# Patient Record
Sex: FEMALE | Race: WHITE | NOT HISPANIC OR LATINO | Employment: FULL TIME | URBAN - NONMETROPOLITAN AREA
[De-identification: names, ages, dates, MRNs, and addresses within clinical notes are randomized per-mention and may not be internally consistent; named-entity substitution may affect disease eponyms.]

---

## 2018-09-21 ENCOUNTER — OFFICE VISIT CONVERTED (OUTPATIENT)
Dept: FAMILY MEDICINE CLINIC | Age: 23
End: 2018-09-21
Attending: NURSE PRACTITIONER

## 2021-04-16 ENCOUNTER — BULK ORDERING (OUTPATIENT)
Dept: CASE MANAGEMENT | Facility: OTHER | Age: 26
End: 2021-04-16

## 2021-04-16 DIAGNOSIS — Z23 IMMUNIZATION DUE: ICD-10-CM

## 2021-05-18 NOTE — PROGRESS NOTES
Rolanda Neumann 1995     Office/Outpatient Visit    Visit Date: Fri, Sep 21, 2018 04:27 pm    Provider: Germaine Kramer N.P. (Assistant: Rubia Owusu)    Location: [facility]        Electronically signed by Germaine Kramer N.P. on  09/25/2018 08:54:53 AM                             SUBJECTIVE:        CC:     Ms. Neumann is a 22 year old White female.  excessive weight gain, fatigue, numbness and tingling in both arms, no period x 2 months (has taken 4 home pregnancy tests - all negative), wants thyroid checked;         HPI:         Patient complains of fatigue.  Patient describes problem of moderate fatigue moderate tiredness for years.  This has been a constant problem.  Patient states that she is not depressed.  She denies associated symptoms, including abdominal pain and adenopathy.  Current affective symptoms include anxious mood.  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  Ms. Neumann notes recent stressors, including third-shift work.      ROS:     CONSTITUTIONAL:  Positive for unintentional weight gain ( gradual; 50 pounds ).      GENITOURINARY:  Positive for irregular menstrual cycle (no periods in 2 months   - negative results from pregnancy).      MUSCULOSKELETAL:  Positive for limb pain ( bilateral upper extremity pain ).      NEUROLOGICAL:  Positive for paresthesia ( bilateral upper extremity ).      ENDOCRINE:  Positive for body feels heavy.      PSYCHIATRIC:  Positive for depression, feelings of stress and difficulty concentrating.          PMH/FMH/SH:     Last Reviewed on 9/25/2018 08:54 AM by Germaine Kramer    Past Medical History:                 PAST MEDICAL HISTORY             GYNECOLOGICAL HISTORY:    Problems with menstrual cycles include irregular frequency/duration.      Current method of contraception is birth control pills;     Last Pap was 10/2015    Has never had a mammogram. Sexually Active? yes         PREVENTIVE HEALTH MAINTENANCE             PAP  SMEAR: was last done 10/2015 with normal results         Surgical History:         Procedures:    Colonoscopy ( 12-2015normal/ dr ximena dunlap )         Family History:         Positive for Hypertension ( mat. GF ).      Positive for Breast Cancer ( MGGM ).      Positive for Type 2 Diabetes ( mat. GF ) and Hypothyroidism ( mother ).  Father: Type 2 Diabetes     Mother: Healthy     Sister(s): Healthy; 1 sister(s) total     Maternal Grandfather: Hypertension         Social History:     Occupation: [employer]     Marital Status: Single     Children: None         Tobacco/Alcohol/Supplements:     Last Reviewed on 9/21/2018 04:31 PM by Rubia Owusu    Tobacco: Past history of cigarette smoking, but has quit.          Substance Abuse History:     Last Reviewed on 6/22/2016 12:43 PM by Raegan Velázquez        Mental Health History:     Last Reviewed on 6/22/2016 12:43 PM by Raegan Velázquez        Communicable Diseases (eg STDs):     Last Reviewed on 6/22/2016 12:43 PM by Raegan Velázquez            Current Problems:     Last Reviewed on 9/25/2018 08:54 AM by Germaine Kramer    Fatigue     Anxiety with depression     IBS, diarrhea prominent type     Follow up, other contraceptive method         Immunizations:     zzGardasil 7/13/2016     Gardasil (HPV quadrivalent) 9/21/2016         Allergies:     Last Reviewed on 9/21/2018 04:31 PM by Rubia Owusu      No Known Drug Allergies.         Current Medications:     Last Reviewed on 9/21/2018 04:31 PM by Rubia Owusu    None        OBJECTIVE:        Vitals:         Current: 9/21/2018 4:31:04 PM    Ht:  5 ft, 2.5 in;  Wt: 178.4 lbs;  BMI: 32.1    T: 98.4 F (oral);  BP: 117/85 mm Hg (left arm, sitting);  P: 79 bpm (left arm (BP Cuff), sitting);  sCr: 0.7 mg/dL;  GFR: 129.04        Exams:     PHYSICAL EXAM:     GENERAL: vital signs recorded - well developed, well nourished, obese;  no apparent distress;     NECK: range of motion is normal;     RESPIRATORY: normal appearance and symmetric  expansion of chest wall; normal respiratory rate and pattern with no distress; normal breath sounds with no rales, rhonchi, wheezes or rubs;     CARDIOVASCULAR: normal rate; rhythm is regular;  no edema;     LYMPHATIC: no enlargement of cervical or facial nodes; no supraclavicular nodes;     MUSCULOSKELETAL: normal gait; normal range of motion of all major muscle groups; no limb or joint pain with range of motion;     NEUROLOGIC: mental status: alert and oriented x 3;     PSYCHIATRIC: affect/demeanor: anxious, tearful;  normal speech pattern; normal thought and perception;         ASSESSMENT           780.79   R53.83  Fatigue              DDx:         ORDERS:         Lab Orders:       [address] CBC, CMP, TSH, B12 levels FATIGUE PANEL  (Send-Out)                   PLAN:          Fatigue will get labs, I suspect some of this may be related to her shift work;  may be some anxiety / depression - discussed sleep study possibility - will have her follow up in 2 weeks after labs     LABORATORY:  Labs ordered to be performed today include Female Fatigue Panel (CBC, CMP, TSH, B12).            Orders:       [id number]  [facility] CBC, CMP, TSH, B12 levels FATIGUE PANEL  (Send-Out)               CHARGE CAPTURE           **Please note: ICD descriptions below are intended for billing purposes only and may not represent clinical diagnoses**        Primary Diagnosis:         780.79 Fatigue            R53.83    Other fatigue              Orders:          82170   Office/outpatient visit; established patient, level 3  (In-House)

## 2021-07-01 VITALS
HEART RATE: 79 BPM | TEMPERATURE: 98.4 F | HEIGHT: 63 IN | DIASTOLIC BLOOD PRESSURE: 85 MMHG | BODY MASS INDEX: 31.61 KG/M2 | SYSTOLIC BLOOD PRESSURE: 117 MMHG | WEIGHT: 178.4 LBS

## 2023-02-23 LAB
EXTERNAL ABO GROUPING: NORMAL
EXTERNAL ANTIBODY SCREEN: NEGATIVE
EXTERNAL HEPATITIS B SURFACE ANTIGEN: NEGATIVE
EXTERNAL HEPATITIS C AB: NONREACTIVE
EXTERNAL RH FACTOR: POSITIVE
EXTERNAL RUBELLA QUALITATIVE: NORMAL
EXTERNAL SYPHILIS RPR SCREEN: NORMAL
HIV1 P24 AG SERPL QL IA: NORMAL

## 2023-04-22 ENCOUNTER — HOSPITAL ENCOUNTER (EMERGENCY)
Facility: HOSPITAL | Age: 28
Discharge: HOME | End: 2023-04-22
Attending: EMERGENCY MEDICINE
Payer: COMMERCIAL

## 2023-04-22 VITALS
TEMPERATURE: 98.4 F | WEIGHT: 165 LBS | OXYGEN SATURATION: 97 % | RESPIRATION RATE: 19 BRPM | HEIGHT: 62 IN | SYSTOLIC BLOOD PRESSURE: 134 MMHG | BODY MASS INDEX: 30.36 KG/M2 | DIASTOLIC BLOOD PRESSURE: 99 MMHG | HEART RATE: 110 BPM

## 2023-04-22 DIAGNOSIS — Z34.92 SECOND TRIMESTER PREGNANCY: ICD-10-CM

## 2023-04-22 DIAGNOSIS — S00.03XA CONTUSION OF SCALP, INITIAL ENCOUNTER: ICD-10-CM
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
## 2023-04-22 PROCEDURE — 99283 EMERGENCY DEPT VISIT LOW MDM: CPT

## 2023-04-22 NOTE — ED PROVIDER NOTES
EMERGENCY DEPARTMENT ENCOUNTER    Room Number:  14/14  Date seen:  4/22/2023  PCP: Provider, No Known  Historian: Patient/spouse      HPI:  Chief Complaint: Fall  A complete HPI/ROS/PMH/PSH/SH/FH are unobtainable due to: None  Context: Rolanda Neumann is a 27 y.o. female who presents to the ED today following a fall at home.  She reports that she was getting in her bathtub when she slipped falling hitting her head on the bathtub itself.  She does complain of a very mild headache currently at the site of impact.  She denies loss of consciousness, nausea/vomiting, blurry vision, neck pain, or confusion.  She is approximately 17 weeks pregnant and reports that the reason she is here is concerned over her baby.  She currently denies abdominal cramping, vaginal discharge, or vaginal bleeding.  She is not on any blood thinning medication.            PAST MEDICAL HISTORY  Active Ambulatory Problems     Diagnosis Date Noted   • No Active Ambulatory Problems     Resolved Ambulatory Problems     Diagnosis Date Noted   • No Resolved Ambulatory Problems     Past Medical History:   Diagnosis Date   • Depression    • IBS (irritable bowel syndrome)    • Kidney stone          PAST SURGICAL HISTORY  Past Surgical History:   Procedure Laterality Date   • WISDOM TOOTH EXTRACTION           FAMILY HISTORY  History reviewed. No pertinent family history.      SOCIAL HISTORY  Social History     Socioeconomic History   • Marital status: Single   Tobacco Use   • Smoking status: Every Day   • Smokeless tobacco: Never   • Tobacco comments:     ELECTRONIC   Vaping Use   • Vaping Use: Every day   Substance and Sexual Activity   • Alcohol use: Yes   • Drug use: Defer   • Sexual activity: Defer         ALLERGIES  Patient has no known allergies.        REVIEW OF SYSTEMS  Review of Systems   Constitutional: Negative for fever.   HENT: Negative for sore throat.    Eyes: Negative.    Respiratory: Negative for cough and shortness of breath.     Cardiovascular: Negative for chest pain.   Gastrointestinal: Negative for abdominal pain, diarrhea and vomiting.   Genitourinary: Negative for dysuria.   Musculoskeletal: Negative for neck pain.   Skin: Negative for rash.   Allergic/Immunologic: Negative.    Neurological: Positive for headaches. Negative for weakness and numbness.   Hematological: Negative.    Psychiatric/Behavioral: Negative.    All other systems reviewed and are negative.         PHYSICAL EXAM  ED Triage Vitals [04/22/23 0450]   Temp Heart Rate Resp BP SpO2   98.4 °F (36.9 °C) 110 19 138/87 97 %      Temp src Heart Rate Source Patient Position BP Location FiO2 (%)   -- -- -- -- --       Physical Exam  Vitals and nursing note reviewed.   Constitutional:       General: She is not in acute distress.  HENT:      Head: Normocephalic and atraumatic.   Eyes:      Pupils: Pupils are equal, round, and reactive to light.   Cardiovascular:      Rate and Rhythm: Normal rate and regular rhythm.      Heart sounds: Normal heart sounds.   Pulmonary:      Effort: Pulmonary effort is normal. No respiratory distress.      Breath sounds: Normal breath sounds.   Abdominal:      Palpations: Abdomen is soft.      Tenderness: There is no abdominal tenderness. There is no guarding or rebound.   Musculoskeletal:         General: Normal range of motion.      Cervical back: Normal range of motion and neck supple.   Skin:     General: Skin is warm and dry.      Findings: No rash.   Neurological:      Mental Status: She is alert and oriented to person, place, and time.      Sensory: Sensation is intact.   Psychiatric:         Mood and Affect: Mood and affect normal.         Vital signs and nursing notes reviewed.          LAB RESULTS  No results found for this or any previous visit (from the past 24 hour(s)).    Ordered the above labs and reviewed the results.        RADIOLOGY  No Radiology Exams Resulted Within Past 24 Hours    Ordered the above noted radiological studies.  Reviewed by me in PACS.            PROCEDURES  Procedures            MEDICATIONS GIVEN IN ER  Medications - No data to display                MEDICAL DECISION MAKING, PROGRESS, and CONSULTS    All labs have been independently reviewed by me.  All radiology studies have been reviewed by me and I have also reviewed the radiology report.   EKG's independently viewed and interpreted by me.  Discussion below represents my analysis of pertinent findings related to patient's condition, differential diagnosis, treatment plan and final disposition.      Additional sources:  - Discussed/ obtained information from independent historians: History obtained from the patient herself at bedside    - External (non-ED) record review: Upon medical records review, the patient was last seen and evaluated in the office of urgent care on 1/29/2021 for an earache where she was diagnosed with acute otitis externa    - Chronic or social conditions impacting care: Currently second trimester pregnancy    - Shared decision making: Discharge decision based on shared conversations have between myself as well as the patient and the patient's spouse at bedside.      Orders placed during this visit:  No orders of the defined types were placed in this encounter.          ED Course as of 04/22/23 0609   Sat Apr 22, 2023   1492 I did perform a quick bedside ultrasound which did show normal spontaneous fetal movement with normal appearing fetal cardiac activity.  Upon seeing this, the patient felt much better and had no complaints.  She is stable for discharge at this point and should follow closely next week with her OB/GYN.  I have instructed her to return immediately should she develop a worsening or changing headache, nausea/vomiting, blurry vision, confusion, abdominal cramping, vaginal bleeding, or any other changes or concerns. [BM]      ED Course User Index  [BM] Abe Morrow MD             DIAGNOSIS  Final diagnoses:   Fall from  standing, initial encounter   Contusion of scalp, initial encounter   Second trimester pregnancy         DISPOSITION  DISCHARGE    Patient discharged in stable condition.    Reviewed implications of results, diagnosis, meds, responsibility to follow up, warning signs and symptoms of possible worsening, potential complications and reasons to return to ER.    Patient/Family voiced understanding of above instructions.    Discussed plan for discharge, as there is no emergent indication for admission. Patient referred to primary care provider for BP management due to today's BP. Pt/family is agreeable and understands need for follow up and repeat testing.  Pt is aware that discharge does not mean that nothing is wrong but it indicates no emergency is present that requires admission and they must continue care with follow-up as given below or physician of their choice.     FOLLOW-UP  Demarco Vasquez MD  [facility] [place] [id number] [phone]    Schedule an appointment as soon as possible for a visit            Medication List      No changes were made to your prescriptions during this visit.                   Latest Documented Vital Signs:  As of 06:09 EDT  BP- 134/99 HR- 110 Temp- 98.4 °F (36.9 °C) O2 sat- 97%              --    Please note that portions of this were completed with a voice recognition program.       Note Disclaimer: At [facility], we believe that sharing information builds trust and better relationships. You are receiving this note because you are receiving care at [facility] or recently visited. It is possible you will see health information before a provider has talked with you about it. This kind of information can be easy to misunderstand. To help you fully understand what it means for your health, we urge you to discuss this note with your provider.             Abe Morrow MD  04/22/23 0655

## 2023-04-22 NOTE — ED TRIAGE NOTES
"Pt presents to ER after a fall in her bathtub about 30 minutes ago. Pt says she hit her head \"really hard\" but did not lose consciousness. Pt reports that she is 17 weeks pregnant. Pt is tearful and concerned that her baby may have been harmed. She denies hitting her abdomen during the fall and denies any pain anywhere.     Spoke with L&D hospitalist who recommends the patient be seen here in the ER and to check fetal heart tones.      "

## 2023-08-31 LAB — EXTERNAL GROUP B STREP ANTIGEN: POSITIVE

## 2023-09-29 ENCOUNTER — ANESTHESIA EVENT (OUTPATIENT)
Dept: LABOR AND DELIVERY | Facility: HOSPITAL | Age: 28
End: 2023-09-29
Payer: COMMERCIAL

## 2023-09-29 ENCOUNTER — ANESTHESIA (OUTPATIENT)
Dept: LABOR AND DELIVERY | Facility: HOSPITAL | Age: 28
End: 2023-09-29
Payer: COMMERCIAL

## 2023-09-29 ENCOUNTER — HOSPITAL ENCOUNTER (INPATIENT)
Facility: HOSPITAL | Age: 28
LOS: 2 days | Discharge: HOME | End: 2023-10-01
Attending: OBSTETRICS & GYNECOLOGY | Admitting: STUDENT IN AN ORGANIZED HEALTH CARE EDUCATION/TRAINING PROGRAM
Payer: COMMERCIAL

## 2023-09-29 ENCOUNTER — HOSPITAL ENCOUNTER (OUTPATIENT)
Dept: LABOR AND DELIVERY | Facility: HOSPITAL | Age: 28
Discharge: HOME | End: 2023-09-29
Payer: COMMERCIAL

## 2023-09-29 PROBLEM — Z34.90 PREGNANCY: Status: ACTIVE | Noted: 2023-09-29

## 2023-09-29 LAB
ABO GROUP BLD: NORMAL
ALBUMIN SERPL-MCNC: 3.6 G/DL (ref 3.5–5.2)
ALBUMIN/GLOB SERPL: 1.2 G/DL
ALP SERPL-CCNC: 157 U/L (ref 39–117)
ALT SERPL W P-5'-P-CCNC: 12 U/L (ref 1–33)
ANION GAP SERPL CALCULATED.3IONS-SCNC: 11.8 MMOL/L (ref 5–15)
AST SERPL-CCNC: 17 U/L (ref 1–32)
BASOPHILS # BLD AUTO: 0.03 10*3/MM3 (ref 0–0.2)
BASOPHILS NFR BLD AUTO: 0.3 % (ref 0–1.5)
BILIRUB SERPL-MCNC: 0.2 MG/DL (ref 0–1.2)
BLD GP AB SCN SERPL QL: NEGATIVE
BUN SERPL-MCNC: 7 MG/DL (ref 6–20)
BUN/CREAT SERPL: 12.7 (ref 7–25)
CALCIUM SPEC-SCNC: 9.4 MG/DL (ref 8.6–10.5)
CHLORIDE SERPL-SCNC: 104 MMOL/L (ref 98–107)
CO2 SERPL-SCNC: 21.2 MMOL/L (ref 22–29)
CREAT SERPL-MCNC: 0.55 MG/DL (ref 0.57–1)
DEPRECATED RDW RBC AUTO: 45.7 FL (ref 37–54)
EGFRCR SERPLBLD CKD-EPI 2021: 127.4 ML/MIN/1.73
EOSINOPHIL # BLD AUTO: 0.11 10*3/MM3 (ref 0–0.4)
EOSINOPHIL NFR BLD AUTO: 1 % (ref 0.3–6.2)
ERYTHROCYTE [DISTWIDTH] IN BLOOD BY AUTOMATED COUNT: 14.4 % (ref 12.3–15.4)
GLOBULIN UR ELPH-MCNC: 3.1 GM/DL
GLUCOSE SERPL-MCNC: 85 MG/DL (ref 65–99)
HCT VFR BLD AUTO: 35.5 % (ref 34–46.6)
HGB BLD-MCNC: 11.9 G/DL (ref 12–15.9)
IMM GRANULOCYTES # BLD AUTO: 0.12 10*3/MM3 (ref 0–0.05)
IMM GRANULOCYTES NFR BLD AUTO: 1.1 % (ref 0–0.5)
LYMPHOCYTES # BLD AUTO: 2.4 10*3/MM3 (ref 0.7–3.1)
LYMPHOCYTES NFR BLD AUTO: 22.1 % (ref 19.6–45.3)
MCH RBC QN AUTO: 29.8 PG (ref 26.6–33)
MCHC RBC AUTO-ENTMCNC: 33.5 G/DL (ref 31.5–35.7)
MCV RBC AUTO: 88.8 FL (ref 79–97)
MONOCYTES # BLD AUTO: 0.92 10*3/MM3 (ref 0.1–0.9)
MONOCYTES NFR BLD AUTO: 8.5 % (ref 5–12)
NEUTROPHILS NFR BLD AUTO: 67 % (ref 42.7–76)
NEUTROPHILS NFR BLD AUTO: 7.26 10*3/MM3 (ref 1.7–7)
NRBC BLD AUTO-RTO: 0.2 /100 WBC (ref 0–0.2)
PLATELET # BLD AUTO: 247 10*3/MM3 (ref 140–450)
PMV BLD AUTO: 10.3 FL (ref 6–12)
POTASSIUM SERPL-SCNC: 4 MMOL/L (ref 3.5–5.2)
PROT SERPL-MCNC: 6.7 G/DL (ref 6–8.5)
RBC # BLD AUTO: 4 10*6/MM3 (ref 3.77–5.28)
RH BLD: POSITIVE
SODIUM SERPL-SCNC: 137 MMOL/L (ref 136–145)
T&S EXPIRATION DATE: NORMAL
WBC NRBC COR # BLD: 10.84 10*3/MM3 (ref 3.4–10.8)

## 2023-09-29 PROCEDURE — 85025 COMPLETE CBC W/AUTO DIFF WBC: CPT | Performed by: STUDENT IN AN ORGANIZED HEALTH CARE EDUCATION/TRAINING PROGRAM

## 2023-09-29 PROCEDURE — 0W3R7ZZ CONTROL BLEEDING IN GENITOURINARY TRACT, VIA NATURAL OR ARTIFICIAL OPENING: ICD-10-PCS | Performed by: OBSTETRICS & GYNECOLOGY

## 2023-09-29 PROCEDURE — 25010000002 METHYLERGONOVINE MALEATE PER 0.2 MG

## 2023-09-29 PROCEDURE — 3E033VJ INTRODUCTION OF OTHER HORMONE INTO PERIPHERAL VEIN, PERCUTANEOUS APPROACH: ICD-10-PCS | Performed by: OBSTETRICS & GYNECOLOGY

## 2023-09-29 PROCEDURE — 25010000002 MORPHINE PER 10 MG: Performed by: ANESTHESIOLOGY

## 2023-09-29 PROCEDURE — 80053 COMPREHEN METABOLIC PANEL: CPT | Performed by: STUDENT IN AN ORGANIZED HEALTH CARE EDUCATION/TRAINING PROGRAM

## 2023-09-29 PROCEDURE — 86901 BLOOD TYPING SEROLOGIC RH(D): CPT | Performed by: STUDENT IN AN ORGANIZED HEALTH CARE EDUCATION/TRAINING PROGRAM

## 2023-09-29 PROCEDURE — 0DQP0ZZ REPAIR RECTUM, OPEN APPROACH: ICD-10-PCS | Performed by: OBSTETRICS & GYNECOLOGY

## 2023-09-29 PROCEDURE — 86900 BLOOD TYPING SEROLOGIC ABO: CPT | Performed by: STUDENT IN AN ORGANIZED HEALTH CARE EDUCATION/TRAINING PROGRAM

## 2023-09-29 PROCEDURE — C1755 CATHETER, INTRASPINAL: HCPCS | Performed by: ANESTHESIOLOGY

## 2023-09-29 PROCEDURE — 86850 RBC ANTIBODY SCREEN: CPT | Performed by: STUDENT IN AN ORGANIZED HEALTH CARE EDUCATION/TRAINING PROGRAM

## 2023-09-29 PROCEDURE — 25010000002 PENICILLIN G POTASSIUM PER 600000 UNITS: Performed by: STUDENT IN AN ORGANIZED HEALTH CARE EDUCATION/TRAINING PROGRAM

## 2023-09-29 PROCEDURE — 10907ZC DRAINAGE OF AMNIOTIC FLUID, THERAPEUTIC FROM PRODUCTS OF CONCEPTION, VIA NATURAL OR ARTIFICIAL OPENING: ICD-10-PCS | Performed by: OBSTETRICS & GYNECOLOGY

## 2023-09-29 RX ORDER — SODIUM CHLORIDE 0.9 % (FLUSH) 0.9 %
10 SYRINGE (ML) INJECTION EVERY 12 HOURS SCHEDULED
Status: DISCONTINUED | OUTPATIENT
Start: 2023-09-29 | End: 2023-09-30 | Stop reason: HOSPADM

## 2023-09-29 RX ORDER — METHYLERGONOVINE MALEATE 0.2 MG/ML
INJECTION INTRAVENOUS
Status: COMPLETED
Start: 2023-09-29 | End: 2023-09-29

## 2023-09-29 RX ORDER — OXYTOCIN/0.9 % SODIUM CHLORIDE 30/500 ML
1-30 PLASTIC BAG, INJECTION (ML) INTRAVENOUS
Status: DISCONTINUED | OUTPATIENT
Start: 2023-09-29 | End: 2023-09-30 | Stop reason: HOSPADM

## 2023-09-29 RX ORDER — ONDANSETRON 2 MG/ML
4 INJECTION INTRAMUSCULAR; INTRAVENOUS ONCE AS NEEDED
Status: DISCONTINUED | OUTPATIENT
Start: 2023-09-29 | End: 2023-09-29 | Stop reason: SDUPTHER

## 2023-09-29 RX ORDER — CARBOPROST TROMETHAMINE 250 UG/ML
250 INJECTION, SOLUTION INTRAMUSCULAR
Status: DISCONTINUED | OUTPATIENT
Start: 2023-09-29 | End: 2023-09-30 | Stop reason: HOSPADM

## 2023-09-29 RX ORDER — MAGNESIUM CARB/ALUMINUM HYDROX 105-160MG
30 TABLET,CHEWABLE ORAL ONCE AS NEEDED
Status: DISCONTINUED | OUTPATIENT
Start: 2023-09-29 | End: 2023-09-30 | Stop reason: HOSPADM

## 2023-09-29 RX ORDER — ACETAMINOPHEN 325 MG/1
650 TABLET ORAL EVERY 4 HOURS PRN
Status: DISCONTINUED | OUTPATIENT
Start: 2023-09-29 | End: 2023-09-30 | Stop reason: HOSPADM

## 2023-09-29 RX ORDER — FAMOTIDINE 10 MG/ML
20 INJECTION, SOLUTION INTRAVENOUS 2 TIMES DAILY PRN
Status: DISCONTINUED | OUTPATIENT
Start: 2023-09-29 | End: 2023-09-30 | Stop reason: HOSPADM

## 2023-09-29 RX ORDER — ONDANSETRON 4 MG/1
4 TABLET, FILM COATED ORAL EVERY 6 HOURS PRN
Status: DISCONTINUED | OUTPATIENT
Start: 2023-09-29 | End: 2023-09-30 | Stop reason: HOSPADM

## 2023-09-29 RX ORDER — DIPHENHYDRAMINE HYDROCHLORIDE 50 MG/ML
12.5 INJECTION INTRAMUSCULAR; INTRAVENOUS EVERY 8 HOURS PRN
Status: DISCONTINUED | OUTPATIENT
Start: 2023-09-29 | End: 2023-09-30 | Stop reason: HOSPADM

## 2023-09-29 RX ORDER — LIDOCAINE HYDROCHLORIDE 10 MG/ML
0.5 INJECTION, SOLUTION INFILTRATION; PERINEURAL ONCE AS NEEDED
Status: DISCONTINUED | OUTPATIENT
Start: 2023-09-29 | End: 2023-09-30 | Stop reason: HOSPADM

## 2023-09-29 RX ORDER — TERBUTALINE SULFATE 1 MG/ML
0.25 INJECTION, SOLUTION SUBCUTANEOUS AS NEEDED
Status: DISCONTINUED | OUTPATIENT
Start: 2023-09-29 | End: 2023-09-30 | Stop reason: HOSPADM

## 2023-09-29 RX ORDER — PENICILLIN G 3000000 [IU]/50ML
3 INJECTION, SOLUTION INTRAVENOUS EVERY 4 HOURS
Status: DISCONTINUED | OUTPATIENT
Start: 2023-09-29 | End: 2023-09-30 | Stop reason: HOSPADM

## 2023-09-29 RX ORDER — SODIUM CHLORIDE 0.9 % (FLUSH) 0.9 %
10 SYRINGE (ML) INJECTION AS NEEDED
Status: DISCONTINUED | OUTPATIENT
Start: 2023-09-29 | End: 2023-09-30 | Stop reason: HOSPADM

## 2023-09-29 RX ORDER — METHYLERGONOVINE MALEATE 0.2 MG/ML
200 INJECTION INTRAVENOUS ONCE AS NEEDED
Status: DISCONTINUED | OUTPATIENT
Start: 2023-09-29 | End: 2023-09-30 | Stop reason: HOSPADM

## 2023-09-29 RX ORDER — SODIUM CHLORIDE 9 MG/ML
40 INJECTION, SOLUTION INTRAVENOUS AS NEEDED
Status: DISCONTINUED | OUTPATIENT
Start: 2023-09-29 | End: 2023-09-30 | Stop reason: HOSPADM

## 2023-09-29 RX ORDER — SODIUM CHLORIDE, SODIUM LACTATE, POTASSIUM CHLORIDE, CALCIUM CHLORIDE 600; 310; 30; 20 MG/100ML; MG/100ML; MG/100ML; MG/100ML
125 INJECTION, SOLUTION INTRAVENOUS CONTINUOUS
Status: DISCONTINUED | OUTPATIENT
Start: 2023-09-29 | End: 2023-10-01

## 2023-09-29 RX ORDER — OXYTOCIN/0.9 % SODIUM CHLORIDE 30/500 ML
250 PLASTIC BAG, INJECTION (ML) INTRAVENOUS CONTINUOUS
Status: ACTIVE | OUTPATIENT
Start: 2023-09-29 | End: 2023-09-30

## 2023-09-29 RX ORDER — FAMOTIDINE 20 MG/1
20 TABLET, FILM COATED ORAL 2 TIMES DAILY PRN
Status: DISCONTINUED | OUTPATIENT
Start: 2023-09-29 | End: 2023-09-30 | Stop reason: HOSPADM

## 2023-09-29 RX ORDER — OXYTOCIN/0.9 % SODIUM CHLORIDE 30/500 ML
999 PLASTIC BAG, INJECTION (ML) INTRAVENOUS ONCE
Status: DISCONTINUED | OUTPATIENT
Start: 2023-09-29 | End: 2023-09-30 | Stop reason: HOSPADM

## 2023-09-29 RX ORDER — FENTANYL CIT 0.2 MG/100ML-ROPIV 0.2%-NACL 0.9% EPIDURAL INJ 2/0.2 MCG/ML-%
10 SOLUTION INJECTION CONTINUOUS
Status: DISCONTINUED | OUTPATIENT
Start: 2023-09-29 | End: 2023-10-01

## 2023-09-29 RX ORDER — EPHEDRINE SULFATE 50 MG/ML
5 INJECTION, SOLUTION INTRAVENOUS
Status: DISCONTINUED | OUTPATIENT
Start: 2023-09-29 | End: 2023-09-30 | Stop reason: HOSPADM

## 2023-09-29 RX ORDER — MISOPROSTOL 200 UG/1
800 TABLET ORAL ONCE AS NEEDED
Status: DISCONTINUED | OUTPATIENT
Start: 2023-09-29 | End: 2023-09-30 | Stop reason: HOSPADM

## 2023-09-29 RX ORDER — TRANEXAMIC ACID 100 MG/ML
INJECTION, SOLUTION INTRAVENOUS
Status: DISCONTINUED
Start: 2023-09-29 | End: 2023-09-30 | Stop reason: WASHOUT

## 2023-09-29 RX ORDER — MORPHINE SULFATE 1 MG/ML
INJECTION, SOLUTION EPIDURAL; INTRATHECAL; INTRAVENOUS AS NEEDED
Status: DISCONTINUED | OUTPATIENT
Start: 2023-09-29 | End: 2023-09-29 | Stop reason: SURG

## 2023-09-29 RX ORDER — TRANEXAMIC ACID 10 MG/ML
1000 INJECTION, SOLUTION INTRAVENOUS ONCE AS NEEDED
Status: DISCONTINUED | OUTPATIENT
Start: 2023-09-29 | End: 2023-10-01

## 2023-09-29 RX ORDER — ONDANSETRON 2 MG/ML
4 INJECTION INTRAMUSCULAR; INTRAVENOUS EVERY 6 HOURS PRN
Status: DISCONTINUED | OUTPATIENT
Start: 2023-09-29 | End: 2023-09-30 | Stop reason: HOSPADM

## 2023-09-29 RX ORDER — LIDOCAINE HYDROCHLORIDE AND EPINEPHRINE 15; 5 MG/ML; UG/ML
INJECTION, SOLUTION EPIDURAL AS NEEDED
Status: DISCONTINUED | OUTPATIENT
Start: 2023-09-29 | End: 2023-09-29 | Stop reason: SURG

## 2023-09-29 RX ADMIN — SODIUM CHLORIDE, POTASSIUM CHLORIDE, SODIUM LACTATE AND CALCIUM CHLORIDE 125 ML/HR: 600; 310; 30; 20 INJECTION, SOLUTION INTRAVENOUS at 00:52

## 2023-09-29 RX ADMIN — PENICILLIN G POTASSIUM 5 MILLION UNITS: 5000000 INJECTION, POWDER, FOR SOLUTION INTRAMUSCULAR; INTRAVENOUS at 01:00

## 2023-09-29 RX ADMIN — LIDOCAINE HYDROCHLORIDE 10 ML: 10 INJECTION, SOLUTION EPIDURAL; INFILTRATION; INTRACAUDAL; PERINEURAL at 23:15

## 2023-09-29 RX ADMIN — Medication 1 MILLI-UNITS/MIN: at 01:38

## 2023-09-29 RX ADMIN — PENICILLIN G 3 MILLION UNITS: 3000000 INJECTION, SOLUTION INTRAVENOUS at 20:49

## 2023-09-29 RX ADMIN — METHYLERGONOVINE MALEATE 200 MCG: 0.2 INJECTION INTRAVENOUS at 23:03

## 2023-09-29 RX ADMIN — Medication 250 ML/HR: at 23:15

## 2023-09-29 RX ADMIN — SODIUM CHLORIDE, POTASSIUM CHLORIDE, SODIUM LACTATE AND CALCIUM CHLORIDE 125 ML/HR: 600; 310; 30; 20 INJECTION, SOLUTION INTRAVENOUS at 18:45

## 2023-09-29 RX ADMIN — PENICILLIN G 3 MILLION UNITS: 3000000 INJECTION, SOLUTION INTRAVENOUS at 04:59

## 2023-09-29 RX ADMIN — PENICILLIN G 3 MILLION UNITS: 3000000 INJECTION, SOLUTION INTRAVENOUS at 16:48

## 2023-09-29 RX ADMIN — PENICILLIN G 3 MILLION UNITS: 3000000 INJECTION, SOLUTION INTRAVENOUS at 08:42

## 2023-09-29 RX ADMIN — Medication 10 ML/HR: at 15:42

## 2023-09-29 RX ADMIN — SODIUM CHLORIDE, POTASSIUM CHLORIDE, SODIUM LACTATE AND CALCIUM CHLORIDE 125 ML/HR: 600; 310; 30; 20 INJECTION, SOLUTION INTRAVENOUS at 15:39

## 2023-09-29 RX ADMIN — MORPHINE SULFATE 3 MG: 1 INJECTION, SOLUTION EPIDURAL; INTRATHECAL; INTRAVENOUS at 23:25

## 2023-09-29 RX ADMIN — LIDOCAINE HYDROCHLORIDE AND EPINEPHRINE 3 ML: 15; 5 INJECTION, SOLUTION EPIDURAL at 15:40

## 2023-09-29 RX ADMIN — SODIUM CHLORIDE, POTASSIUM CHLORIDE, SODIUM LACTATE AND CALCIUM CHLORIDE 125 ML/HR: 600; 310; 30; 20 INJECTION, SOLUTION INTRAVENOUS at 08:07

## 2023-09-29 RX ADMIN — PENICILLIN G 3 MILLION UNITS: 3000000 INJECTION, SOLUTION INTRAVENOUS at 12:48

## 2023-09-29 NOTE — PLAN OF CARE
Problem: Adult Inpatient Plan of Care  Goal: Plan of Care Review  Outcome: Ongoing, Progressing  Flowsheets (Taken 9/29/2023 0649)  Outcome Evaluation: Pt has rested on and off through the night.  Reports occas UC.  FHR tracing cat 1 at this time.  Will cont to titrate pitocin to achieve adequate labor.  Goal: Patient-Specific Goal (Individualized)  Outcome: Ongoing, Progressing  Goal: Absence of Hospital-Acquired Illness or Injury  Outcome: Ongoing, Progressing  Goal: Optimal Comfort and Wellbeing  Outcome: Ongoing, Progressing  Intervention: Provide Person-Centered Care  Recent Flowsheet Documentation  Taken 9/29/2023 0109 by Victoria Ramírez, RN  Trust Relationship/Rapport:   care explained   choices provided   emotional support provided   empathic listening provided   questions answered   questions encouraged   reassurance provided   thoughts/feelings acknowledged  Goal: Readiness for Transition of Care  Outcome: Ongoing, Progressing  Intervention: Mutually Develop Transition Plan  Recent Flowsheet Documentation  Taken 9/29/2023 0115 by Victoria Ramírez, RN  Equipment Currently Used at Home: none     Problem: Bleeding (Labor)  Goal: Hemostasis  Outcome: Ongoing, Progressing     Problem: Change in Fetal Wellbeing (Labor)  Goal: Stable Fetal Wellbeing  Outcome: Ongoing, Progressing     Problem: Delayed Labor Progression (Labor)  Goal: Effective Progression to Delivery  Outcome: Ongoing, Progressing     Problem: Infection (Labor)  Goal: Absence of Infection Signs and Symptoms  Outcome: Ongoing, Progressing     Problem: Labor Pain (Labor)  Goal: Acceptable Pain Control  Outcome: Ongoing, Progressing     Problem: Uterine Tachysystole (Labor)  Goal: Normal Uterine Contraction Pattern  Outcome: Ongoing, Progressing   Goal Outcome Evaluation:              Outcome Evaluation: Pt has rested on and off through the night.  Reports occas UC.  FHR tracing cat 1 at this time.  Will cont to titrate pitocin to achieve adequate  labor.

## 2023-09-29 NOTE — PLAN OF CARE
Problem: Adult Inpatient Plan of Care  Goal: Plan of Care Review  Outcome: Ongoing, Progressing  Flowsheets (Taken 9/29/2023 1811)  Progress: improving  Plan of Care Reviewed With: patient  Outcome Evaluation: Pt is progressing with labor and pain is controlled. Pt will continue POC.  Goal: Patient-Specific Goal (Individualized)  Outcome: Ongoing, Progressing  Goal: Absence of Hospital-Acquired Illness or Injury  Outcome: Ongoing, Progressing  Intervention: Identify and Manage Fall Risk  Recent Flowsheet Documentation  Taken 9/29/2023 1600 by Madelin Alcaraz RN  Safety Promotion/Fall Prevention: safety round/check completed  Taken 9/29/2023 0720 by Madelin Alcaraz RN  Safety Promotion/Fall Prevention: safety round/check completed  Intervention: Prevent and Manage VTE (Venous Thromboembolism) Risk  Recent Flowsheet Documentation  Taken 9/29/2023 0720 by Madelin Alcaraz RN  Activity Management: up ad meseret  VTE Prevention/Management: sequential compression devices off  Goal: Optimal Comfort and Wellbeing  Outcome: Ongoing, Progressing  Intervention: Provide Person-Centered Care  Recent Flowsheet Documentation  Taken 9/29/2023 0720 by Madelin Alcaraz RN  Trust Relationship/Rapport:   care explained   choices provided   emotional support provided   empathic listening provided   questions answered   questions encouraged   reassurance provided   thoughts/feelings acknowledged  Goal: Readiness for Transition of Care  Outcome: Ongoing, Progressing     Problem: Bleeding (Labor)  Goal: Hemostasis  Outcome: Ongoing, Progressing     Problem: Change in Fetal Wellbeing (Labor)  Goal: Stable Fetal Wellbeing  Outcome: Ongoing, Progressing     Problem: Delayed Labor Progression (Labor)  Goal: Effective Progression to Delivery  Outcome: Ongoing, Progressing     Problem: Infection (Labor)  Goal: Absence of Infection Signs and Symptoms  Outcome: Ongoing, Progressing     Problem: Labor Pain (Labor)  Goal: Acceptable Pain  Control  Outcome: Ongoing, Progressing     Problem: Uterine Tachysystole (Labor)  Goal: Normal Uterine Contraction Pattern  Outcome: Ongoing, Progressing     Problem:  Fall Injury Risk  Goal: Absence of Fall, Infant Drop and Related Injury  Outcome: Ongoing, Progressing  Intervention: Promote Injury-Free Environment  Recent Flowsheet Documentation  Taken 2023 1600 by Madelin Alcaraz RN  Safety Promotion/Fall Prevention: safety round/check completed  Taken 2023 0720 by Madelin Alcaraz RN  Safety Promotion/Fall Prevention: safety round/check completed     Problem: Skin Injury Risk Increased  Goal: Skin Health and Integrity  Outcome: Ongoing, Progressing  Intervention: Optimize Skin Protection  Recent Flowsheet Documentation  Taken 2023 0720 by Madelin Alcaraz RN  Head of Bed (HOB) Positioning: HOB at 20-30 degrees   Goal Outcome Evaluation:  Plan of Care Reviewed With: patient        Progress: improving  Outcome Evaluation: Pt is progressing with labor and pain is controlled. Pt will continue POC.

## 2023-09-29 NOTE — ANESTHESIA PREPROCEDURE EVALUATION
Anesthesia Evaluation     NPO Solid Status: > 4 hours  NPO Liquid Status: > 4 hours           Airway   Mallampati: I  No difficulty expected  Dental      Pulmonary    Cardiovascular   Exercise tolerance: good (4-7 METS)        Neuro/Psych  (+) psychiatric history  GI/Hepatic/Renal/Endo    (+) obesity    Musculoskeletal     Abdominal    Substance History      OB/GYN    (+) Pregnant        Other                        Anesthesia Plan    ASA 2     epidural       Anesthetic plan, risks, benefits, and alternatives have been provided, discussed and informed consent has been obtained with: patient.      CODE STATUS:    Level Of Support Discussed With: Patient  Code Status (Patient has no pulse and is not breathing): CPR (Attempt to Resuscitate)  Medical Interventions (Patient has pulse or is breathing): Full Support

## 2023-09-29 NOTE — ANESTHESIA PROCEDURE NOTES
Labor Epidural      Patient reassessed immediately prior to procedure    Patient location during procedure: OB  Performed By  Anesthesiologist: Noé Orozco MD  Preanesthetic Checklist  Completed: patient identified, IV checked, site marked, risks and benefits discussed, surgical consent, monitors and equipment checked, pre-op evaluation and timeout performed  Prep:  Pt Position:sitting  Sterile Tech:gloves, cap and sterile barrier  Prep:chlorhexidine gluconate and isopropyl alcohol  Monitoring:continuous pulse oximetry, EKG and blood pressure monitoring  Epidural Block Procedure:  Approach:midline  Guidance:landmark technique  Location:L3-L4  Needle Type:Tuohy  Needle Gauge:18 G  Loss of Resistance Medium: air  Loss of Resistance: 6cm  Cath Depth at skin:11 cm  Paresthesia: none  Aspiration:negative  Test Dose:negative  Number of Attempts: 1  Post Assessment:  Dressing:secured with tape and occlusive dressing applied  Pt Tolerance:patient tolerated the procedure well with no apparent complications  Complications:no

## 2023-09-29 NOTE — H&P
[facility]  Obstetric History and Physical    Patient Name: Rolanda Neumann  :  1995  MRN:  [id number]      Chief Complaint   Patient presents with    Scheduled Induction       Subjective     Patient is a 27 y.o. female  currently at 40w0d, who presents for term IOL with favorable cervix. .       Her prenatal care has been  complicated by  GBS POS.     Past Medical History:   Diagnosis Date    Depression     IBS (irritable bowel syndrome)     Kidney stone      Past Surgical History:   Procedure Laterality Date    WISDOM TOOTH EXTRACTION       Patient has no known allergies.      Objective       Vital Signs Range for the last 24 hours  Temperature: Temp:  [97.8 °F (36.6 °C)-98 °F (36.7 °C)] 98 °F (36.7 °C)   Temp Source: Temp src: Oral   BP: BP: ()/(47-86) 114/76   Pulse: Heart Rate:  [73-96] 73   Respirations: Resp:  [16] 16                   Physical Examination:     General :  Alert in NAD  Abdomen: Gravid, EFW 8# by leopolds    Presentation: vertex   Cervix: Exam by: Method: sterile exam per physician   Dilation: Cervical Dilation (cm): 3   Effacement: Cervical Effacement: 70-80%   Station: Fetal Station: -2  AROM with clear fluid. IUPC placed.        Fetal Heart Rate Assessment   Method: Fetal HR Assessment Method: external   Beats/min: Fetal HR (beats/min): 135   Baseline: Fetal HR Baseline: normal range   Varibility: Fetal HR Variability: moderate (amplitude range 6 to 25 bpm)   Accels: Fetal HR Accelerations: greater than/equal to 15 bpm, lasting at least 15 seconds   Decels: Fetal HR Decelerations: variable   Tracing Category: Fetal HR Tracing Category: Category I     Uterine Assessment   Method: Method: external tocotransducer   Frequency (min): Contraction Frequency (Minutes): 1-2   Ctx Count in 10 min:     Duration:     Intensity: Contraction Intensity: no contractions   Intensity by IUPC:     Resting Tone: Uterine Resting Tone: soft by palpation   Resting Tone by IUPC:     Keshia  Units:           Results from last 7 days   Lab Units 23  0052   WBC 10*3/mm3 10.84*   HEMOGLOBIN g/dL 11.9*   HEMATOCRIT % 35.5   PLATELETS 10*3/mm3 247       Assessment & Plan     1.  Intrauterine pregnancy at 40w0d weeks gestation term IOL with favorable cervix.    Reassuring fetal status.   Continue pitocin. Anticipate .  Plan of care has been reviewed with patient and family.  All questions answered.      Pregnancy        H&P updated. The patient was examined and the following changes are noted above.         Demarco Vasquez MD  2023  11:26 EDT

## 2023-09-30 LAB
BASOPHILS # BLD AUTO: 0.02 10*3/MM3 (ref 0–0.2)
BASOPHILS NFR BLD AUTO: 0.1 % (ref 0–1.5)
DEPRECATED RDW RBC AUTO: 45.4 FL (ref 37–54)
EOSINOPHIL # BLD AUTO: 0.03 10*3/MM3 (ref 0–0.4)
EOSINOPHIL NFR BLD AUTO: 0.2 % (ref 0.3–6.2)
ERYTHROCYTE [DISTWIDTH] IN BLOOD BY AUTOMATED COUNT: 14 % (ref 12.3–15.4)
HCT VFR BLD AUTO: 29.3 % (ref 34–46.6)
HGB BLD-MCNC: 9.9 G/DL (ref 12–15.9)
IMM GRANULOCYTES # BLD AUTO: 0.08 10*3/MM3 (ref 0–0.05)
IMM GRANULOCYTES NFR BLD AUTO: 0.5 % (ref 0–0.5)
LYMPHOCYTES # BLD AUTO: 1.98 10*3/MM3 (ref 0.7–3.1)
LYMPHOCYTES NFR BLD AUTO: 13.2 % (ref 19.6–45.3)
MCH RBC QN AUTO: 30.1 PG (ref 26.6–33)
MCHC RBC AUTO-ENTMCNC: 33.8 G/DL (ref 31.5–35.7)
MCV RBC AUTO: 89.1 FL (ref 79–97)
MONOCYTES # BLD AUTO: 1.06 10*3/MM3 (ref 0.1–0.9)
MONOCYTES NFR BLD AUTO: 7.1 % (ref 5–12)
NEUTROPHILS NFR BLD AUTO: 11.81 10*3/MM3 (ref 1.7–7)
NEUTROPHILS NFR BLD AUTO: 78.9 % (ref 42.7–76)
NRBC BLD AUTO-RTO: 0 /100 WBC (ref 0–0.2)
PLATELET # BLD AUTO: 200 10*3/MM3 (ref 140–450)
PMV BLD AUTO: 9.9 FL (ref 6–12)
RBC # BLD AUTO: 3.29 10*6/MM3 (ref 3.77–5.28)
WBC NRBC COR # BLD: 14.98 10*3/MM3 (ref 3.4–10.8)

## 2023-09-30 PROCEDURE — 85025 COMPLETE CBC W/AUTO DIFF WBC: CPT | Performed by: OBSTETRICS & GYNECOLOGY

## 2023-09-30 RX ORDER — PROMETHAZINE HYDROCHLORIDE 12.5 MG/1
12.5 SUPPOSITORY RECTAL EVERY 6 HOURS PRN
Status: DISCONTINUED | OUTPATIENT
Start: 2023-09-30 | End: 2023-10-01 | Stop reason: HOSPADM

## 2023-09-30 RX ORDER — PROMETHAZINE HYDROCHLORIDE 25 MG/1
25 TABLET ORAL EVERY 6 HOURS PRN
Status: DISCONTINUED | OUTPATIENT
Start: 2023-09-30 | End: 2023-10-01 | Stop reason: HOSPADM

## 2023-09-30 RX ORDER — DOCUSATE SODIUM 100 MG/1
100 CAPSULE, LIQUID FILLED ORAL 2 TIMES DAILY
Status: DISCONTINUED | OUTPATIENT
Start: 2023-09-30 | End: 2023-10-01 | Stop reason: HOSPADM

## 2023-09-30 RX ORDER — BISACODYL 10 MG
10 SUPPOSITORY, RECTAL RECTAL DAILY PRN
Status: DISCONTINUED | OUTPATIENT
Start: 2023-09-30 | End: 2023-10-01 | Stop reason: HOSPADM

## 2023-09-30 RX ORDER — METHYLERGONOVINE MALEATE 0.2 MG/ML
200 INJECTION INTRAVENOUS ONCE AS NEEDED
Status: DISCONTINUED | OUTPATIENT
Start: 2023-09-30 | End: 2023-10-01 | Stop reason: HOSPADM

## 2023-09-30 RX ORDER — KETOROLAC TROMETHAMINE 15 MG/ML
15 INJECTION, SOLUTION INTRAMUSCULAR; INTRAVENOUS EVERY 6 HOURS PRN
Status: DISCONTINUED | OUTPATIENT
Start: 2023-09-30 | End: 2023-10-01 | Stop reason: HOSPADM

## 2023-09-30 RX ORDER — HYDROCORTISONE 25 MG/G
CREAM TOPICAL AS NEEDED
Status: DISCONTINUED | OUTPATIENT
Start: 2023-09-30 | End: 2023-10-01 | Stop reason: HOSPADM

## 2023-09-30 RX ORDER — CALCIUM CARBONATE 500 MG/1
2 TABLET, CHEWABLE ORAL 3 TIMES DAILY PRN
Status: DISCONTINUED | OUTPATIENT
Start: 2023-09-30 | End: 2023-10-01 | Stop reason: HOSPADM

## 2023-09-30 RX ORDER — ONDANSETRON 2 MG/ML
4 INJECTION INTRAMUSCULAR; INTRAVENOUS EVERY 6 HOURS PRN
Status: DISCONTINUED | OUTPATIENT
Start: 2023-09-30 | End: 2023-10-01 | Stop reason: HOSPADM

## 2023-09-30 RX ORDER — TRAMADOL HYDROCHLORIDE 50 MG/1
50 TABLET ORAL EVERY 6 HOURS PRN
Status: DISCONTINUED | OUTPATIENT
Start: 2023-09-30 | End: 2023-10-01 | Stop reason: HOSPADM

## 2023-09-30 RX ORDER — TRANEXAMIC ACID 10 MG/ML
1000 INJECTION, SOLUTION INTRAVENOUS ONCE AS NEEDED
Status: DISCONTINUED | OUTPATIENT
Start: 2023-09-30 | End: 2023-10-01 | Stop reason: HOSPADM

## 2023-09-30 RX ORDER — LIDOCAINE HYDROCHLORIDE 10 MG/ML
10 INJECTION, SOLUTION EPIDURAL; INFILTRATION; INTRACAUDAL; PERINEURAL ONCE
Status: COMPLETED | OUTPATIENT
Start: 2023-09-30 | End: 2023-09-29

## 2023-09-30 RX ORDER — HYDROXYZINE 50 MG/1
50 TABLET, FILM COATED ORAL NIGHTLY PRN
Status: DISCONTINUED | OUTPATIENT
Start: 2023-09-30 | End: 2023-10-01 | Stop reason: HOSPADM

## 2023-09-30 RX ORDER — ONDANSETRON 4 MG/1
4 TABLET, FILM COATED ORAL EVERY 8 HOURS PRN
Status: DISCONTINUED | OUTPATIENT
Start: 2023-09-30 | End: 2023-10-01 | Stop reason: HOSPADM

## 2023-09-30 RX ORDER — MISOPROSTOL 200 UG/1
600 TABLET ORAL ONCE AS NEEDED
Status: DISCONTINUED | OUTPATIENT
Start: 2023-09-30 | End: 2023-10-01 | Stop reason: HOSPADM

## 2023-09-30 RX ORDER — OXYTOCIN/0.9 % SODIUM CHLORIDE 30/500 ML
125 PLASTIC BAG, INJECTION (ML) INTRAVENOUS CONTINUOUS PRN
Status: DISCONTINUED | OUTPATIENT
Start: 2023-09-30 | End: 2023-10-01 | Stop reason: HOSPADM

## 2023-09-30 RX ORDER — IBUPROFEN 600 MG/1
600 TABLET ORAL EVERY 6 HOURS PRN
Status: DISCONTINUED | OUTPATIENT
Start: 2023-09-30 | End: 2023-10-01 | Stop reason: HOSPADM

## 2023-09-30 RX ORDER — ACETAMINOPHEN 325 MG/1
650 TABLET ORAL EVERY 6 HOURS PRN
Status: DISCONTINUED | OUTPATIENT
Start: 2023-09-30 | End: 2023-10-01 | Stop reason: HOSPADM

## 2023-09-30 RX ADMIN — DOCUSATE SODIUM 100 MG: 100 CAPSULE, LIQUID FILLED ORAL at 20:55

## 2023-09-30 RX ADMIN — ACETAMINOPHEN 650 MG: 325 TABLET, FILM COATED ORAL at 08:55

## 2023-09-30 RX ADMIN — ACETAMINOPHEN 650 MG: 325 TABLET, FILM COATED ORAL at 18:31

## 2023-09-30 RX ADMIN — DOCUSATE SODIUM 100 MG: 100 CAPSULE, LIQUID FILLED ORAL at 08:55

## 2023-09-30 RX ADMIN — Medication 1 APPLICATION: at 06:40

## 2023-09-30 RX ADMIN — IBUPROFEN 600 MG: 600 TABLET, FILM COATED ORAL at 18:31

## 2023-09-30 NOTE — LACTATION NOTE
Patient is sitting up eating breakfast but is anxious to get some sleep. She is concerned because baby has been spitting up and they have had to use the bulb syringe. LC reviewed how and when to use bulb properly and to roll baby on side and pat his back if needed. Patient knows when to use emergency call light. She feels baby has latched well so far . LC #on WB

## 2023-09-30 NOTE — LACTATION NOTE
Baby Ricky sleepy after circ. LC provided hand pump and practiced breast massage and hand expression . Mom pumped 3.5 cc . LC demonstrated how to syringe feed infant. He took 2.5 cc then began to let it roll out of his mouth. Baby had just been circumcised and patient was worried because she could not wake him to nurse.

## 2023-09-30 NOTE — PROGRESS NOTES
[facility]  Vaginal Delivery Progress Note    Patient Name: Rolanda Neumann  :  1995  MRN:  [id number]      Subjective   Postpartum Day 1: Vaginal Delivery of a male infant.  Delivery complicated by 4th degree laceration, PPH, QBL 1051, responsive to pitocin, cytotec, methergine and HENRI - now removed.     The patient feels well without complaints.  Her pain is well controlled.  Reports normal lochia.     The patient plans to breastfeed.    Objective     Vital Signs Range for the last 24 hours  Temperature: Temp:  [97.8 °F (36.6 °C)-99.1 °F (37.3 °C)] 98.6 °F (37 °C)       BP: BP: ()/(56-94) 123/76   Pulse: Heart Rate:  [] 118   Respirations: Resp:  [16-18] 16                       Physical Exam:  General: Awake and alert  Abdomen: Fundus: firm, non tender, U-2 below umbilicus  Extremities:  trace edema, NT     Labs:     Results from last 7 days   Lab Units 23  0052   WBC 10*3/mm3 10.84*   HEMOGLOBIN g/dL 11.9*   HEMATOCRIT % 35.5   PLATELETS 10*3/mm3 247       Prenatal labs results reviewed:  Yes   Rubella:  immune  Rh Status:    RH type   Date Value Ref Range Status   2023 Positive  Final         Assessment & Plan  : 1. PPD1 S/P  - Doing well, continue usual cares. CBC still pending this AM - will f/u.  Requests circumcision - risks reviewed, consent obtained          Pregnancy          Esme Ramírez MD  2023  09:54 EDT

## 2023-09-30 NOTE — NURSING NOTE
Paged Dr Vasquez. Informed that suction has been off since 0032 and that uterus is firm without massage at .  Ordered to remove Savita at this time.

## 2023-09-30 NOTE — ANESTHESIA POSTPROCEDURE EVALUATION
Patient: Rolanda Neumann    Procedure Summary       Date: 09/29/23 Room / Location:     Anesthesia Start: 1535 Anesthesia Stop: 2330    Procedure: LABOR ANALGESIA Diagnosis:     Scheduled Providers:  Provider: Noé Orozco MD    Anesthesia Type: epidural ASA Status: 2            Anesthesia Type: epidural    Vitals  Vitals Value Taken Time   /70 09/29/23 2330   Temp 37.2 °C (99 °F) 09/29/23 2051   Pulse 100 09/29/23 2330   Resp 17 09/29/23 2051   SpO2 99 % 09/29/23 2250   Vitals shown include unvalidated device data.        Post Anesthesia Care and Evaluation      Comments: I or one of my partners was immediately available during labor and the post-partum period.

## 2023-09-30 NOTE — L&D DELIVERY NOTE
[facility]  Vaginal Delivery Note    Patient Name: Rolanda Neumann  :  1995  MRN:  [id number]    DX:    Pregnancy      Labor status: Induction of labor     Delivery     Delivery: Vaginal, Spontaneous     YOB: 2025    Time of Birth: 10:53 PM      Anesthesia: Epidural     Delivering clinician: Demarco Vasquez MD       Infant    Findings: Viable male  infant    Infant observations: Weight: 3645 g (8 lb 0.6 oz)        Apgars: 8  @ 1 minute /    9  @ 5 minutes     Placenta, Cord, and Fluid    Placenta delivered  Spontaneous   at  2023 10:58 PM     Cord: 3 vessels  present.   Cord blood obtained: Yes          Repair    Episiotomy: No   Lacerations: 4th degree      Quantitative Blood Loss:    Quantitative Blood Loss (mL): 1051 mL         Delivery narrative: Rolanda Neumann is a 27 y.o.  at 40w0d.  Presented  for term IOL. Started on pitocin and required high doses in order to achieve adequate contractions and cervical changes.  Had  artificial rupture of membranes at 11:11 AM  on 2023   Received epidural anesthesia. Progressed normally to C/C/+1 @ 2023  8:50 PM . Labored down until 2023 10:20 PM . Fetal status reassuring throughout.  of viable  male  @ 10:53 PM  over an intact perineum. Nuchal cord x 1 reduced. ASDE. Delayed cord clamping performed and infant placed in kangaroo care.   3645 g (8 lb 0.6 oz) .     Apgar :  8  @ 1 minute / 9  @ 5 minutes.  Placenta delivered spontaneously, intact with 3 vessel cord. Cervix intact.. 4th degree laceration noted. Rectal mucosal layer closed with running 4-0 chromic. Sphincter re-approximated with interrupted 0 Vicryl.  2nd degree portion repaired in usual fashion with 3-0 Monocryl suture.     Uterine atony noted - manual massage and methergine x1 given. Cavity manually inspected and noted to be clear of any retained tissue. HENRI system placed with good improvement in bleeding.     Mother and baby recovering good condition.        Demarco Vasquez MD  09/30/23  00:39 EDT    Note done at time of delivery then refreshed at a later date to include RN documentation not yet completed.

## 2023-09-30 NOTE — PLAN OF CARE
Problem: Adult Inpatient Plan of Care  Goal: Plan of Care Review  Outcome: Ongoing, Progressing  Goal: Patient-Specific Goal (Individualized)  Outcome: Ongoing, Progressing  Goal: Absence of Hospital-Acquired Illness or Injury  Outcome: Ongoing, Progressing  Goal: Optimal Comfort and Wellbeing  Outcome: Ongoing, Progressing  Goal: Readiness for Transition of Care  Outcome: Ongoing, Progressing     Problem:  Fall Injury Risk  Goal: Absence of Fall, Infant Drop and Related Injury  Outcome: Ongoing, Progressing     Problem: Skin Injury Risk Increased  Goal: Skin Health and Integrity  Outcome: Ongoing, Progressing     Problem: Device-Related Complication Risk (Anesthesia/Analgesia, Neuraxial)  Goal: Safe Infusion Delivery Completion  Outcome: Ongoing, Progressing     Problem: Infection (Anesthesia/Analgesia, Neuraxial)  Goal: Absence of Infection Signs and Symptoms  Outcome: Ongoing, Progressing     Problem: Nausea and Vomiting (Anesthesia/Analgesia, Neuraxial)  Goal: Nausea and Vomiting Relief  Outcome: Ongoing, Progressing     Problem: Pain (Anesthesia/Analgesia, Neuraxial)  Goal: Effective Pain Control  Outcome: Ongoing, Progressing     Problem: Respiratory Compromise (Anesthesia/Analgesia, Neuraxial)  Goal: Effective Oxygenation and Ventilation  Outcome: Ongoing, Progressing     Problem: Sensorimotor Impairment (Anesthesia/Analgesia, Neuraxial)  Goal: Baseline Motor Function  Outcome: Ongoing, Progressing     Problem: Urinary Retention (Anesthesia/Analgesia, Neuraxial)  Goal: Effective Urinary Elimination  Outcome: Ongoing, Progressing   Goal Outcome Evaluation:

## 2023-09-30 NOTE — PLAN OF CARE
Problem: Adult Inpatient Plan of Care  Goal: Plan of Care Review  Outcome: Ongoing, Progressing  Flowsheets  Taken 9/30/2023 0700 by Norma Kennedy RN  Plan of Care Reviewed With:   patient   spouse  Taken 9/30/2023 0023 by Sweta Gonzalez RN  Progress: improving  Goal: Patient-Specific Goal (Individualized)  Outcome: Ongoing, Progressing  Goal: Absence of Hospital-Acquired Illness or Injury  Outcome: Ongoing, Progressing  Intervention: Identify and Manage Fall Risk  Recent Flowsheet Documentation  Taken 9/30/2023 0635 by Norma Kennedy RN  Safety Promotion/Fall Prevention: safety round/check completed  Taken 9/30/2023 0436 by Norma Kennedy RN  Safety Promotion/Fall Prevention: safety round/check completed  Taken 9/30/2023 0345 by Norma Kennedy RN  Safety Promotion/Fall Prevention: safety round/check completed  Taken 9/30/2023 0229 by Norma Kennedy RN  Safety Promotion/Fall Prevention: safety round/check completed  Taken 9/30/2023 0150 by Norma Kennedy RN  Safety Promotion/Fall Prevention: safety round/check completed  Intervention: Prevent and Manage VTE (Venous Thromboembolism) Risk  Recent Flowsheet Documentation  Taken 9/30/2023 0635 by Norma Kennedy RN  Activity Management: ambulated to bathroom  Taken 9/30/2023 0436 by Norma Kennedy RN  Activity Management:   bedrest   activity encouraged  Taken 9/30/2023 0345 by Norma Kennedy RN  Activity Management: activity encouraged  Taken 9/30/2023 0229 by Norma Kennedy RN  Activity Management:   bedrest   activity encouraged  Taken 9/30/2023 0150 by Norma Kennedy RN  Activity Management:   bedrest   activity encouraged  Goal: Optimal Comfort and Wellbeing  Outcome: Ongoing, Progressing  Intervention: Provide Person-Centered Care  Recent Flowsheet Documentation  Taken 9/30/2023 0150 by Norma Kennedy RN  Trust Relationship/Rapport:   care explained   choices provided   questions answered   questions encouraged   thoughts/feelings  acknowledged  Goal: Readiness for Transition of Care  Outcome: Ongoing, Progressing     Problem:  Fall Injury Risk  Goal: Absence of Fall, Infant Drop and Related Injury  Outcome: Ongoing, Progressing  Intervention: Promote Injury-Free Environment  Recent Flowsheet Documentation  Taken 2023 0635 by Norma Kennedy RN  Safety Promotion/Fall Prevention: safety round/check completed  Taken 2023 0436 by Norma Kennedy RN  Safety Promotion/Fall Prevention: safety round/check completed  Taken 2023 0345 by Norma Kennedy RN  Safety Promotion/Fall Prevention: safety round/check completed  Taken 2023 0229 by Norma Kennedy RN  Safety Promotion/Fall Prevention: safety round/check completed  Taken 2023 0150 by Norma Kennedy RN  Safety Promotion/Fall Prevention: safety round/check completed     Problem: Skin Injury Risk Increased  Goal: Skin Health and Integrity  Outcome: Ongoing, Progressing     Problem: Device-Related Complication Risk (Anesthesia/Analgesia, Neuraxial)  Goal: Safe Infusion Delivery Completion  Outcome: Ongoing, Progressing     Problem: Infection (Anesthesia/Analgesia, Neuraxial)  Goal: Absence of Infection Signs and Symptoms  Outcome: Ongoing, Progressing     Problem: Nausea and Vomiting (Anesthesia/Analgesia, Neuraxial)  Goal: Nausea and Vomiting Relief  Outcome: Ongoing, Progressing     Problem: Pain (Anesthesia/Analgesia, Neuraxial)  Goal: Effective Pain Control  Outcome: Ongoing, Progressing     Problem: Respiratory Compromise (Anesthesia/Analgesia, Neuraxial)  Goal: Effective Oxygenation and Ventilation  Outcome: Ongoing, Progressing     Problem: Sensorimotor Impairment (Anesthesia/Analgesia, Neuraxial)  Goal: Baseline Motor Function  Outcome: Ongoing, Progressing  Intervention: Optimize Sensorimotor Function  Recent Flowsheet Documentation  Taken 2023 0635 by Norma Kennedy RN  Safety Promotion/Fall Prevention: safety round/check completed  Taken 2023  0436 by Huley, Norma, RN  Safety Promotion/Fall Prevention: safety round/check completed  Taken 9/30/2023 0345 by Norma Kennedy RN  Safety Promotion/Fall Prevention: safety round/check completed  Taken 9/30/2023 0229 by Norma Kennedy RN  Safety Promotion/Fall Prevention: safety round/check completed  Taken 9/30/2023 0150 by Norma Kennedy RN  Safety Promotion/Fall Prevention: safety round/check completed     Problem: Urinary Retention (Anesthesia/Analgesia, Neuraxial)  Goal: Effective Urinary Elimination  Outcome: Ongoing, Progressing  Intervention: Promote Effective Urine Elimination  Recent Flowsheet Documentation  Taken 9/30/2023 0150 by Norma Kennedy RN  Urinary Elimination Promotion: frequent voiding encouraged     Problem: Adjustment to Role Transition (Postpartum Vaginal Delivery)  Goal: Successful Maternal Role Transition  Outcome: Ongoing, Progressing  Goal: Successful Maternal Role Transition  Outcome: Ongoing, Progressing     Problem: Bleeding (Postpartum Vaginal Delivery)  Goal: Hemostasis  Outcome: Ongoing, Progressing  Goal: Hemostasis  Outcome: Ongoing, Progressing     Problem: Infection (Postpartum Vaginal Delivery)  Goal: Absence of Infection Signs/Symptoms  Outcome: Ongoing, Progressing  Intervention: Prevent or Manage Infection  Recent Flowsheet Documentation  Taken 9/30/2023 0635 by Norma Kennedy RN  Perineal Care:   absorbent brief/pad changed   cold pack/ice pack removed   cold pack/ice pack applied   medicated pads applied   perineal spray bottle/warm water use encouraged   perineum cleansed  Goal: Absence of Infection Signs/Symptoms  Outcome: Ongoing, Progressing  Intervention: Prevent or Manage Infection  Recent Flowsheet Documentation  Taken 9/30/2023 0635 by Norma Kennedy RN  Perineal Care:   absorbent brief/pad changed   cold pack/ice pack removed   cold pack/ice pack applied   medicated pads applied   perineal spray bottle/warm water use encouraged   perineum cleansed      Problem: Pain (Postpartum Vaginal Delivery)  Goal: Acceptable Pain Control  Outcome: Ongoing, Progressing  Goal: Acceptable Pain Control  Outcome: Ongoing, Progressing     Problem: Urinary Retention (Postpartum Vaginal Delivery)  Goal: Effective Urinary Elimination  Outcome: Ongoing, Progressing  Intervention: Promote Effective Urinary Elimination  Recent Flowsheet Documentation  Taken 9/30/2023 0150 by Norma Kennedy RN  Urinary Elimination Promotion: frequent voiding encouraged  Goal: Effective Urinary Elimination  Outcome: Ongoing, Progressing  Intervention: Promote Effective Urinary Elimination  Recent Flowsheet Documentation  Taken 9/30/2023 0150 by Norma Kennedy RN  Urinary Elimination Promotion: frequent voiding encouraged     Problem: Breastfeeding  Goal: Effective Breastfeeding  Outcome: Ongoing, Progressing   Goal Outcome Evaluation:  Plan of Care Reviewed With: patient, spouse

## 2023-09-30 NOTE — LACTATION NOTE
This note was copied from a baby's chart.  P1, T-new admission-Informed patient that LC is here to help with breastfeeding.  Patient reports that infant just finished breastfeeding and has had 2 stools and 1 wet so far.  D/w  feeding patterns, attempting to feed every 2-3 hours or on demand, and expected output.  Patient has PBP.  LC number on white board and encouraged to call with any questions.

## 2023-09-30 NOTE — PLAN OF CARE
Problem: Adult Inpatient Plan of Care  Goal: Plan of Care Review  2023 by Sweta Gonzalez RN  Outcome: Ongoing, Progressing  2023 by Sweta Gonzalez RN  Outcome: Ongoing, Progressing  Goal: Patient-Specific Goal (Individualized)  2023 by Sweta Gonzalez RN  Outcome: Ongoing, Progressing  2023 by Sweta Gonzalez RN  Outcome: Ongoing, Progressing  Goal: Absence of Hospital-Acquired Illness or Injury  2023 by Sweta Gonzalez RN  Outcome: Ongoing, Progressing  2023 by Sweta Gonzalez RN  Outcome: Ongoing, Progressing  Goal: Optimal Comfort and Wellbeing  2023 by Sweta Gonzalez RN  Outcome: Ongoing, Progressing  2023 by Sweta Gonzalez RN  Outcome: Ongoing, Progressing  Goal: Readiness for Transition of Care  2023 by Sweta Gonzalez RN  Outcome: Ongoing, Progressing  2023 by Sweta Gonzalez RN  Outcome: Ongoing, Progressing     Problem:  Fall Injury Risk  Goal: Absence of Fall, Infant Drop and Related Injury  2023 by Sweta Gonzalez RN  Outcome: Ongoing, Progressing  2023 by Sweta Gonzalez RN  Outcome: Ongoing, Progressing     Problem: Skin Injury Risk Increased  Goal: Skin Health and Integrity  2023 by Sweta Gonzalez RN  Outcome: Ongoing, Progressing  2023 by Sweta Gonzalez RN  Outcome: Ongoing, Progressing     Problem: Device-Related Complication Risk (Anesthesia/Analgesia, Neuraxial)  Goal: Safe Infusion Delivery Completion  2023 by Sweta Gonzalez RN  Outcome: Ongoing, Progressing  2023 by Sweta Gonzalez RN  Outcome: Ongoing, Progressing     Problem: Infection (Anesthesia/Analgesia, Neuraxial)  Goal: Absence of Infection Signs and Symptoms  2023 by Sweta Gonzalez RN  Outcome: Ongoing, Progressing  2023 by Claycamp, Sweta, RN  Outcome: Ongoing, Progressing     Problem: Nausea and Vomiting  (Anesthesia/Analgesia, Neuraxial)  Goal: Nausea and Vomiting Relief  2023 by Sweta Gonzalez RN  Outcome: Ongoing, Progressing  2023 by Sweta Gonzalez RN  Outcome: Ongoing, Progressing     Problem: Pain (Anesthesia/Analgesia, Neuraxial)  Goal: Effective Pain Control  2023 by Sweta Gonzalez RN  Outcome: Ongoing, Progressing  2023 by Sweta Gonzalez RN  Outcome: Ongoing, Progressing     Problem: Respiratory Compromise (Anesthesia/Analgesia, Neuraxial)  Goal: Effective Oxygenation and Ventilation  2023 by Sweta Gonzalez RN  Outcome: Ongoing, Progressing  2023 by Sweta Gonzalez RN  Outcome: Ongoing, Progressing     Problem: Sensorimotor Impairment (Anesthesia/Analgesia, Neuraxial)  Goal: Baseline Motor Function  2023 by Sweta Gonzalez RN  Outcome: Ongoing, Progressing  2023 by Sweta Gonzalez RN  Outcome: Ongoing, Progressing     Problem: Urinary Retention (Anesthesia/Analgesia, Neuraxial)  Goal: Effective Urinary Elimination  2023 by Sweta Gonzalez RN  Outcome: Ongoing, Progressing  2023 by Sweta Gonzalez RN  Outcome: Ongoing, Progressing     Problem: Adjustment to Role Transition (Postpartum Vaginal Delivery)  Goal: Successful Maternal Role Transition  Outcome: Ongoing, Progressing     Problem: Bleeding (Postpartum Vaginal Delivery)  Goal: Hemostasis  Outcome: Ongoing, Progressing     Problem: Infection (Postpartum Vaginal Delivery)  Goal: Absence of Infection Signs/Symptoms  Outcome: Ongoing, Progressing     Problem: Pain (Postpartum Vaginal Delivery)  Goal: Acceptable Pain Control  Outcome: Ongoing, Progressing     Problem: Urinary Retention (Postpartum Vaginal Delivery)  Goal: Effective Urinary Elimination  Outcome: Ongoing, Progressing   Goal Outcome Evaluation:  Plan of Care Reviewed With: patient, spouse        Progress: improving  Outcome Evaluation:  with 4th degree laceration and post partum  hemorrhage due to uterine atony. Savita placed at 2324. Pt also received methergine. Pt denies pain at this moment.

## 2023-10-01 VITALS
BODY MASS INDEX: 40.3 KG/M2 | SYSTOLIC BLOOD PRESSURE: 125 MMHG | RESPIRATION RATE: 18 BRPM | HEIGHT: 62 IN | HEART RATE: 99 BPM | OXYGEN SATURATION: 96 % | WEIGHT: 219 LBS | DIASTOLIC BLOOD PRESSURE: 78 MMHG | TEMPERATURE: 98.4 F

## 2023-10-01 PROBLEM — D62 ANEMIA DUE TO ACUTE BLOOD LOSS: Status: ACTIVE | Noted: 2023-10-01

## 2023-10-01 RX ORDER — IBUPROFEN 600 MG/1
600 TABLET ORAL EVERY 6 HOURS PRN
Qty: 30 TABLET | Refills: 0 | Status: SHIPPED | OUTPATIENT
Start: 2023-10-01

## 2023-10-01 RX ORDER — TRAMADOL HYDROCHLORIDE 50 MG/1
50 TABLET ORAL EVERY 8 HOURS PRN
Qty: 6 TABLET | Refills: 0 | Status: SHIPPED | OUTPATIENT
Start: 2023-10-01 | End: 2023-10-03

## 2023-10-01 RX ORDER — ACETAMINOPHEN 325 MG/1
650 TABLET ORAL EVERY 6 HOURS PRN
Qty: 30 TABLET | Refills: 0 | Status: SHIPPED | OUTPATIENT
Start: 2023-10-01

## 2023-10-01 RX ORDER — POLYETHYLENE GLYCOL 3350 17 G/17G
17 POWDER, FOR SOLUTION ORAL DAILY
Qty: 238 G | Refills: 1 | Status: SHIPPED | OUTPATIENT
Start: 2023-10-01

## 2023-10-01 RX ADMIN — IBUPROFEN 600 MG: 600 TABLET, FILM COATED ORAL at 05:08

## 2023-10-01 RX ADMIN — TRAMADOL HYDROCHLORIDE 50 MG: 50 TABLET ORAL at 09:39

## 2023-10-01 RX ADMIN — DOCUSATE SODIUM 100 MG: 100 CAPSULE, LIQUID FILLED ORAL at 09:39

## 2023-10-01 RX ADMIN — ACETAMINOPHEN 650 MG: 325 TABLET, FILM COATED ORAL at 05:08

## 2023-10-01 NOTE — LACTATION NOTE
Patient hoping to discharge today . Baby latches easily to well everted nipples and milk is coming in. Have discussed when to expect growth spurts and allowing baby to nurse his fill without over using breast pump to have stored milk. Enc follow up in [facility].  Lactation Consult Note    Evaluation Completed: 10/1/2023 12:15 EDT  Patient Name: Rolanda Neumann  :  1995  MRN:  [id number]     REFERRAL  INFORMATION:                          Date of Referral: 10/01/23   Person Making Referral: lactation consultant  Maternal Reason for Referral: no prior breastfeeding experience         MATERNAL ASSESSMENT:     Breast Shape: round, pendulous (10/01/23 1200)  Breast Density: filling (10/01/23 1200)  Areola: elastic (10/01/23 1200)  Nipples: everted (10/01/23 1200)  Nipple Width: 1.5 cm (10/01/23 1200)             MATERNAL INFANT FEEDING:     Maternal Emotional State: receptive (10/01/23 1200)  Infant Positioning: clutch/football (10/01/23 1200)   Signs of Milk Transfer: deep jaw excursions noted, suck/swallow ratio, transfer present (10/01/23 1200)  Pain with Feeding: no (10/01/23 1200)           Milk Ejection Reflex: present (10/01/23 1200)  Comfort Measures Following Feeding: air-drying encouraged, breast cream/oil applied, expressed milk applied, soap use discouraged (10/01/23 1200)        Latch Assistance: none needed, verbal guidance offered (10/01/23 1200)                                                                                EQUIPMENT TYPE:  Breast Pump Type: double electric, personal, manual pump (10/01/23 1200)                              BREAST PUMPING:          LACTATION REFERRALS:

## 2023-10-01 NOTE — PLAN OF CARE
Goal Outcome Evaluation:      Patient education complete. Patient ready for discharge to home with .

## 2023-10-01 NOTE — PLAN OF CARE
Goal Outcome Evaluation:      VSS. Up ad meseret. Good pain control with po pain medication. Plans for discharge tomorrow.

## 2023-10-01 NOTE — DISCHARGE SUMMARY
Date of Discharge:  10/1/2023    Discharge Diagnosis:   Pregnancy    Perineal laceration of anal mucosa (4th degree)  Post partum hemorrhagia due to atony   Postpartum anemia secondary to acute blood loss.        Presenting Problem/History of Present Illness  Pregnancy [Z34.90] @ 40 weeks         Hospital Course  Patient is a 27 y.o. female  @ 40 weeks presented for term induction of labor due to favorable cervix.  She had a protracted latent phase but once reaching active phase progressed normally.  Spontaneous vaginal delivery of a viable male infant on  at 10:53 PM by myself.  Infant weighed 8 pounds 0.6 ounces with Apgars of 8 and 9.  She did have 1/4 degree laceration noted and had postpartum hemorrhage secondary to atony requiring use of the Emmy system.    Her postpartum course has been uncomplicated. Her bleeding has been normal throughout.  She has minimal pain and is voiding and ambulating without difficulty.  Some mild postpartum anemia for which she is asymptomatic.      Condition on Discharge:   Subjective   Postpartum Day 2 Vaginal Delivery.    The patient feels well without complaints.    Vital Signs  Temp:  [97.8 °F (36.6 °C)-98.3 °F (36.8 °C)] 98.3 °F (36.8 °C)  Heart Rate:  [] 99  Resp:  [16-17] 17  BP: (111-113)/(73-77) 111/73    Physical Exam:   General: Awake and alert   Abdomen: Fundus: firm, non tender    Extremities:  Calves NT bilaterally      Results from last 7 days   Lab Units 23  0906 23  0052   WBC 10*3/mm3 14.98* 10.84*   HEMOGLOBIN g/dL 9.9* 11.9*   HEMATOCRIT % 29.3* 35.5   PLATELETS 10*3/mm3 200 247       Assessment & Plan      1. PPD2  S/P  - Stable for discharge. Instructions reviewed.    2. 4th degree laceration -discussed avoiding constipation and recommend daily MiraLAX.  I will see her in the office in 2 weeks to see how she is doing.    3.  Acute blood loss anemia -she had postpartum hemorrhage secondary to atony her postdelivery hemoglobin is  9.9 she is asymptomatic.  We will wait to begin iron replacement therapy until I see her back in 2 weeks -to avoid constipation given her fourth degree laceration.       Discharge Disposition  Home or Self Care    Discharge Medications     Discharge Medications        New Medications        Instructions Start Date   acetaminophen 325 MG tablet  Commonly known as: TYLENOL   650 mg, Oral, Every 6 Hours PRN      ibuprofen 600 MG tablet  Commonly known as: ADVIL,MOTRIN   600 mg, Oral, Every 6 Hours PRN      polyethylene glycol 17 g packet  Commonly known as: MiraLax   17 g, Oral, Daily      traMADol 50 MG tablet  Commonly known as: ULTRAM   50 mg, Oral, Every 8 Hours PRN                 The patient has been prescribed a controlled substance.  She has been counseled on the risks associated with using the medication.   The addictive potential of this medication and alternatives were discussed carefully with this patient and she demonstrated understanding.  A ESAU report has been obtained and reviewed.       Activity at Discharge: 1) Avoid strenuous activity for 2 weeks then gradually increase activity as tolerated.   2) Return to school / work in 6 weeks.  3) Pelvic rest for 6 weeks  4)No driving until no longer taking narcotics.      Follow-up Appointments  No future appointments.  Additional Instructions for the Follow-ups that You Need to Schedule       Call MD With Problems / Concerns   As directed      Call for heavy vaginal bleeding (more than 1 pad per hour), severe abdominal or vaginal pain, fever over 100.6    Order Comments: Call for heavy vaginal bleeding (more than 1 pad per hour), severe abdominal or vaginal pain, fever over 100.6         Discharge Follow-up with Specified Provider: Dr Demarco Vasquez; 2 Weeks   As directed      To: Dr Demarco Vasquez   Follow Up: 2 Weeks   Follow Up Details: & 6 weeks post partum                       Demarco Vasquez MD  10/01/23  12:55 EDT

## 2023-10-01 NOTE — PLAN OF CARE
Problem: Adult Inpatient Plan of Care  Goal: Plan of Care Review  Outcome: Ongoing, Progressing  Flowsheets  Taken 2023 by Norma Kennedy RN  Plan of Care Reviewed With: patient  Taken 2023 0023 by Sweta Gonzalez RN  Progress: improving     Problem: Adult Inpatient Plan of Care  Goal: Patient-Specific Goal (Individualized)  Outcome: Ongoing, Progressing     Problem: Adult Inpatient Plan of Care  Goal: Absence of Hospital-Acquired Illness or Injury  Intervention: Prevent and Manage VTE (Venous Thromboembolism) Risk  Recent Flowsheet Documentation  Taken 2023 by Norma Kennedy RN  Activity Management: up ad meseret  VTE Prevention/Management: sequential compression devices off     Problem: Adult Inpatient Plan of Care  Goal: Optimal Comfort and Wellbeing  Outcome: Ongoing, Progressing  Intervention: Provide Person-Centered Care  Recent Flowsheet Documentation  Taken 2023 by Norma Kennedy RN  Trust Relationship/Rapport:   care explained   choices provided   questions answered   questions encouraged   thoughts/feelings acknowledged     Problem: Adult Inpatient Plan of Care  Goal: Optimal Comfort and Wellbeing  Intervention: Provide Person-Centered Care  Recent Flowsheet Documentation  Taken 2023 by Norma Kennedy RN  Trust Relationship/Rapport:   care explained   choices provided   questions answered   questions encouraged   thoughts/feelings acknowledged     Problem:  Fall Injury Risk  Goal: Absence of Fall, Infant Drop and Related Injury  Outcome: Ongoing, Progressing  Intervention: Promote Injury-Free Environment  Recent Flowsheet Documentation  Taken 2023 by Norma Kennedy RN  Safety Promotion/Fall Prevention: safety round/check completed     Problem: Pain (Anesthesia/Analgesia, Neuraxial)  Goal: Effective Pain Control  Outcome: Ongoing, Progressing     Problem: Bleeding (Postpartum Vaginal Delivery)  Goal: Hemostasis  Outcome: Ongoing,  Progressing     Problem: Urinary Retention (Postpartum Vaginal Delivery)  Goal: Effective Urinary Elimination  Outcome: Ongoing, Progressing  Intervention: Promote Effective Urinary Elimination  Recent Flowsheet Documentation  Taken 9/30/2023 2045 by Norma Kennedy, RN  Urinary Elimination Promotion: frequent voiding encouraged     Problem: Breastfeeding  Goal: Effective Breastfeeding  Outcome: Ongoing, Progressing   Goal Outcome Evaluation:  Plan of Care Reviewed With: patient

## 2023-10-03 NOTE — PAYOR COMM NOTE
"Rolanda Bright (27 y.o. Female)   [facility]  [address] [place] [address]  Facility NPI: [id number]  Ceasar Elliott  Fax: [phone]  Phone: [phone] (Rochelle: [phone])  Subject: ADMISSION NOTIFICATION AUTH CLINICALS  Reference #: [id number]  Please don't hesitate to contact me with any questions or concerns.          Date of Birth   1995    Social Security Number       Address   [address] [address]    Home Phone   [phone]    MRN   [id number]       Anabaptism   Unknown    Marital Status                               Admission Date   9/29/23    Admission Type   Elective    Admitting Provider   Tesha Adames MD    Attending Provider       Department, Room/Bed   [facility] [address] [facility], S309/1       Discharge Date   10/1/2023    Discharge Disposition   Home or Self Care    Discharge Destination                                 Attending Provider: (none)   Allergies: No Known Allergies    Isolation: None   Infection: None   Code Status: Prior    Ht: 157.5 cm (62\")   Wt: 99.3 kg (219 lb)    Admission Cmt: None   Principal Problem: Pregnancy [Z34.90]                   Active Insurance as of 9/29/2023       Primary Coverage       Payor Plan Insurance Group Employer/Plan Group    [employer]HEM BLUE CROSS AN[employer]M BLUE CROSS BLUE [employer]IELD PPO [id number]       Payor Plan Address Payor Plan Phone Number Payor Plan Fax Number Effective Dates    [address] [phone]  5/5/2019 - None Entered    [place] [address]         Subscriber Name Subscriber Birth Date Member ID       ROLANDA BRIGHT 1995 [id number]                     Emergency Contacts        (Rel.) Home Phone Work Phone Mobile Phone    SCHWAB,BENJAMIN (Significant Other) [phone] -- --              Insurance Information                  ANTHEM BLUE CROSS/ANTHEM BLUE CROSS BLUE SHIELD PPO Phone: [phone]    Subscriber: Rolanda Bright Subscriber#: [id number]    Group#: [id number] " Precert#: --             History & Physical        Demarco Vasquez MD at 23 1126          [facility]  Obstetric History and Physical    Patient Name: Rolanda Neumann  :  1995  MRN:  [id number]      Chief Complaint   Patient presents with    Scheduled Induction       Subjective    Patient is a 27 y.o. female  currently at 40w0d, who presents for term IOL with favorable cervix. .       Her prenatal care has been  complicated by  GBS POS.     Past Medical History:   Diagnosis Date    Depression     IBS (irritable bowel syndrome)     Kidney stone      Past Surgical History:   Procedure Laterality Date    WISDOM TOOTH EXTRACTION       Patient has no known allergies.      Objective      Vital Signs Range for the last 24 hours  Temperature: Temp:  [97.8 °F (36.6 °C)-98 °F (36.7 °C)] 98 °F (36.7 °C)   Temp Source: Temp src: Oral   BP: BP: ()/(47-86) 114/76   Pulse: Heart Rate:  [73-96] 73   Respirations: Resp:  [16] 16                   Physical Examination:     General :  Alert in NAD  Abdomen: Gravid, EFW 8# by leopolds    Presentation: vertex   Cervix: Exam by: Method: sterile exam per physician   Dilation: Cervical Dilation (cm): 3   Effacement: Cervical Effacement: 70-80%   Station: Fetal Station: -2  AROM with clear fluid. IUPC placed.        Fetal Heart Rate Assessment   Method: Fetal HR Assessment Method: external   Beats/min: Fetal HR (beats/min): 135   Baseline: Fetal HR Baseline: normal range   Varibility: Fetal HR Variability: moderate (amplitude range 6 to 25 bpm)   Accels: Fetal HR Accelerations: greater than/equal to 15 bpm, lasting at least 15 seconds   Decels: Fetal HR Decelerations: variable   Tracing Category: Fetal HR Tracing Category: Category I     Uterine Assessment   Method: Method: external tocotransducer   Frequency (min): Contraction Frequency (Minutes): 1-2   Ctx Count in 10 min:     Duration:     Intensity: Contraction Intensity: no contractions   Intensity by  IUPC:     Resting Tone: Uterine Resting Tone: soft by palpation   Resting Tone by IUPC:     Wayne Units:           Results from last 7 days   Lab Units 23  0052   WBC 10*3/mm3 10.84*   HEMOGLOBIN g/dL 11.9*   HEMATOCRIT % 35.5   PLATELETS 10*3/mm3 247       Assessment & Plan    1.  Intrauterine pregnancy at 40w0d weeks gestation term IOL with favorable cervix.    Reassuring fetal status.   Continue pitocin. Anticipate .  Plan of care has been reviewed with patient and family.  All questions answered.      Pregnancy        H&P updated. The patient was examined and the following changes are noted above.         Demarco Vasquez MD  2023  11:26 EDT        Electronically signed by Demarco Vasquez MD at 23 1128       Facility-Administered Medications as of 10/1/2023   Medication Dose Route Frequency Provider Last Rate Last Admin    [] lactated ringers bolus 1,000 mL  1,000 mL Intravenous Once PRN Tesha Adames MD        [COMPLETED] lidocaine PF 1% (XYLOCAINE) injection 10 mL  10 mL Injection Once Demarco Vasquez MD   10 mL at 23 2315    [] oxytocin (PITOCIN) 30 units in 0.9% sodium chloride 500 mL (premix)  250 mL/hr Intravenous Continuous Tesha Adames  mL/hr at 23 0020 125 mL/hr at 23 0020    [COMPLETED] penicillin G potassium 5 Million Units in sodium chloride 0.9 % 100 mL IVPB-VTB  5 Million Units Intravenous Once Tesha Adames MD   Stopped at 23 0130     Lab Results (last 72 hours)       Procedure Component Value Units Date/Time    CBC & Differential [[id number]]  (Abnormal) Collected: 23 0906    Specimen: Blood Updated: 23 1005    Narrative:      The following orders were created for panel order CBC & Differential.  Procedure                               Abnormality         Status                     ---------                               -----------         ------                     CBC Auto  Differential[[id number]]        Abnormal            Final result                 Please view results for these tests on the individual orders.    CBC Auto Differential [[id number]]  (Abnormal) Collected: 09/30/23 0906    Specimen: Blood Updated: 09/30/23 1005     WBC 14.98 10*3/mm3      RBC 3.29 10*6/mm3      Hemoglobin 9.9 g/dL      Hematocrit 29.3 %      MCV 89.1 fL      MCH 30.1 pg      MCHC 33.8 g/dL      RDW 14.0 %      RDW-SD 45.4 fl      MPV 9.9 fL      Platelets 200 10*3/mm3      Neutrophil % 78.9 %      Lymphocyte % 13.2 %      Monocyte % 7.1 %      Eosinophil % 0.2 %      Basophil % 0.1 %      Immature Grans % 0.5 %      Neutrophils, Absolute 11.81 10*3/mm3      Lymphocytes, Absolute 1.98 10*3/mm3      Monocytes, Absolute 1.06 10*3/mm3      Eosinophils, Absolute 0.03 10*3/mm3      Basophils, Absolute 0.02 10*3/mm3      Immature Grans, Absolute 0.08 10*3/mm3      nRBC 0.0 /100 WBC     Comprehensive Metabolic Panel [[id number]]  (Abnormal) Collected: 09/29/23 0052    Specimen: Blood Updated: 09/29/23 0200     Glucose 85 mg/dL      BUN 7 mg/dL      Creatinine 0.55 mg/dL      Sodium 137 mmol/L      Potassium 4.0 mmol/L      Chloride 104 mmol/L      CO2 21.2 mmol/L      Calcium 9.4 mg/dL      Total Protein 6.7 g/dL      Albumin 3.6 g/dL      ALT (SGPT) 12 U/L      AST (SGOT) 17 U/L      Alkaline Phosphatase 157 U/L      Total Bilirubin 0.2 mg/dL      Globulin 3.1 gm/dL      A/G Ratio 1.2 g/dL      BUN/Creatinine Ratio 12.7     Anion Gap 11.8 mmol/L      eGFR 127.4 mL/min/1.73     Narrative:      GFR Normal >60  Chronic Kidney Disease <60  Kidney Failure <15      Rubella Antibody, IgG [[id number]] Resulted: 02/23/23    Specimen: Blood Updated: 09/29/23 0148     External Rubella Qual Immune    HIV-1 Antibody, EIA [[id number]] Resulted: 02/23/23    Specimen: Blood Updated: 09/29/23 0148     External HIV Antibody Non-Reactive    Group B Streptococcus Culture - Swab, Vaginal/Rectum [[id number]] Resulted: 08/31/23     Specimen: Swab from Vaginal/Rectum Updated: 09/29/23 0148     External Strep Group B Ag Positive    Hepatitis C Antibody [[id number]] Resulted: 02/23/23    Specimen: Blood Updated: 09/29/23 0148     External Hepatitis C Ab nonreactive    Hepatitis B Surface Antigen [[id number]] Resulted: 02/23/23    Specimen: Blood Updated: 09/29/23 0148     External Hepatitis B Surface Ag Negative    RPR [[id number]] Resulted: 02/23/23    Specimen: Blood Updated: 09/29/23 0148     External RPR Non-Reactive    CBC & Differential [[id number]]  (Abnormal) Collected: 09/29/23 0052    Specimen: Blood Updated: 09/29/23 0139    Narrative:      The following orders were created for panel order CBC & Differential.  Procedure                               Abnormality         Status                     ---------                               -----------         ------                     CBC Auto Differential[[id number]]        Abnormal            Final result                 Please view results for these tests on the individual orders.    CBC Auto Differential [[id number]]  (Abnormal) Collected: 09/29/23 0052    Specimen: Blood Updated: 09/29/23 0139     WBC 10.84 10*3/mm3      RBC 4.00 10*6/mm3      Hemoglobin 11.9 g/dL      Hematocrit 35.5 %      MCV 88.8 fL      MCH 29.8 pg      MCHC 33.5 g/dL      RDW 14.4 %      RDW-SD 45.7 fl      MPV 10.3 fL      Platelets 247 10*3/mm3      Neutrophil % 67.0 %      Lymphocyte % 22.1 %      Monocyte % 8.5 %      Eosinophil % 1.0 %      Basophil % 0.3 %      Immature Grans % 1.1 %      Neutrophils, Absolute 7.26 10*3/mm3      Lymphocytes, Absolute 2.40 10*3/mm3      Monocytes, Absolute 0.92 10*3/mm3      Eosinophils, Absolute 0.11 10*3/mm3      Basophils, Absolute 0.03 10*3/mm3      Immature Grans, Absolute 0.12 10*3/mm3      nRBC 0.2 /100 WBC           Imaging Results (Last 72 Hours)       ** No results found for the last 72 hours. **          ECG/EMG Results (last 72 hours)       ** No results found for the  last 72 hours. **          Orders (last 72 hrs)        Start     Ordered    10/01/23 1413  influenza vac split quad (FLUZONE,FLUARIX,AFLURIA,FLULAVAL) injection 0.5 mL  During Hospitalization,   Status:  Discontinued         10/01/23 1439    10/01/23 1255  Discharge patient  Once         10/01/23 1254    10/01/23 1255  Discontinue IV  Once,   Status:  Canceled         10/01/23 1254    10/01/23 0000  acetaminophen (TYLENOL) 325 MG tablet  Every 6 Hours PRN         10/01/23 1254    10/01/23 0000  ibuprofen (ADVIL,MOTRIN) 600 MG tablet  Every 6 Hours PRN         10/01/23 1254    10/01/23 0000  traMADol (ULTRAM) 50 MG tablet  Every 8 Hours PRN         10/01/23 1254    10/01/23 0000  polyethylene glycol (MiraLax) 17 GM/SCOOP powder  Daily         10/01/23 1254    10/01/23 0000  Call MD With Problems / Concerns        Comments: Call for heavy vaginal bleeding (more than 1 pad per hour), severe abdominal or vaginal pain, fever over 100.6    10/01/23 1254    10/01/23 0000  Discharge Follow-up with Specified Provider: Dr Demarco Vasquez; 2 Weeks         10/01/23 1254    10/01/23 0000  Other Activity Instructions        Comments: Activity Instructions: 1) Avoid strenuous activity for first 2 weeks then gradually increase activity as tolerated.   2) Return to school / work in 6 weeks.  3) Pelvic rest for 6 weeks  4)No driving until no longer taking narcotics.    10/01/23 1254    09/30/23 0900  docusate sodium (COLACE) capsule 100 mg  2 Times Daily,   Status:  Discontinued         09/30/23 0248    09/30/23 0248  Ambulate Patient  Every Shift,   Status:  Canceled       09/30/23 0248    09/30/23 0248  Apply Ice to Perineum  As Needed,   Status:  Canceled      Comments: For 20 min q 2 hrs    09/30/23 0248    09/30/23 0248  Waffle Cushion  As Needed,   Status:  Canceled      Comments: For perineal discomfort    09/30/23 0248    09/30/23 0248  Donut Ring  As Needed,   Status:  Canceled      Comments: For perineal pain    09/30/23  0248 09/30/23 0248  Kpad  As Needed,   Status:  Canceled      Comments: For pain    09/30/23 0248    09/30/23 0248  Apply witch hazel pads / TUCKS to perineum as needed for comfort PRN  As Needed,   Status:  Canceled       09/30/23 0248    09/30/23 0248  Warm compress  As Needed,   Status:  Canceled       09/30/23 0248    09/30/23 0248  Apply ace wrap, tight bra, or binder  As Needed,   Status:  Canceled       09/30/23 0248    09/30/23 0248  Apply ice packs  As Needed,   Status:  Canceled       09/30/23 0248    09/30/23 0248  oxytocin (PITOCIN) 30 units in 0.9% sodium chloride 500 mL (premix)  Continuous PRN,   Status:  Discontinued         09/30/23 0248 09/30/23 0248  ibuprofen (ADVIL,MOTRIN) tablet 600 mg  Every 6 Hours PRN,   Status:  Discontinued         09/30/23 0248 09/30/23 0248  acetaminophen (TYLENOL) tablet 650 mg  Every 6 Hours PRN,   Status:  Discontinued         09/30/23 0248 09/30/23 0248  traMADol (ULTRAM) tablet 50 mg  Every 6 Hours PRN,   Status:  Discontinued         09/30/23 0248 09/30/23 0248  ketorolac (TORADOL) injection 15 mg  Every 6 Hours PRN,   Status:  Discontinued         09/30/23 0248 09/30/23 0248  miSOPROStol (CYTOTEC) tablet 600 mcg  Once As Needed,   Status:  Discontinued         09/30/23 0248 09/30/23 0248  methylergonovine (METHERGINE) injection 200 mcg  Once As Needed,   Status:  Discontinued         09/30/23 0248 09/30/23 0248  tranexamic acid 1000 mg in 100 mL 0.7% NaCl infusion (premix)  Once As Needed,   Status:  Discontinued         09/30/23 0248 09/30/23 0248  hydrOXYzine (ATARAX) tablet 50 mg  Nightly PRN,   Status:  Discontinued         09/30/23 0248 09/30/23 0248  bisacodyl (DULCOLAX) suppository 10 mg  Daily PRN,   Status:  Discontinued         09/30/23 0248 09/30/23 0248  benzocaine-menthol (DERMOPLAST) 20-0.5 % topical spray  As Needed,   Status:  Discontinued         09/30/23 0248 09/30/23 0248  Hydrocort-Pramoxine (Perianal)  (PROCTOFOAM-HS) 1-1 % rectal foam 1 application   As Needed,   Status:  Discontinued         09/30/23 0248 09/30/23 0248  Hydrocortisone (Perianal) (ANUSOL-HC) 2.5 % rectal cream  As Needed,   Status:  Discontinued         09/30/23 0248 09/30/23 0248  benzocaine (AMERICAINE) 20 % rectal ointment  As Needed,   Status:  Discontinued         09/30/23 0248 09/30/23 0248  ondansetron (ZOFRAN) tablet 4 mg  Every 8 Hours PRN,   Status:  Discontinued         09/30/23 0248 09/30/23 0248  ondansetron (ZOFRAN) injection 4 mg  Every 6 Hours PRN,   Status:  Discontinued         09/30/23 0248 09/30/23 0248  promethazine (PHENERGAN) tablet 25 mg  Every 6 Hours PRN,   Status:  Discontinued        See Hyperspace for full Linked Orders Report.    09/30/23 0248 09/30/23 0248  promethazine (PHENERGAN) suppository 12.5 mg  Every 6 Hours PRN,   Status:  Discontinued        See Hyperspace for full Linked Orders Report.    09/30/23 0248 09/30/23 0248  calcium carbonate (TUMS) chewable tablet 500 mg (200 mg elemental)  3 Times Daily PRN,   Status:  Discontinued         09/30/23 0248 09/30/23 0248  magnesium hydroxide (MILK OF MAGNESIA) 400 MG/5ML suspension 15 mL  Daily PRN,   Status:  Discontinued         09/30/23 0248 09/29/23 2316  tranexamic acid 1000 mg in 100 mL 0.7% NaCl infusion (premix)  Once As Needed,   Status:  Discontinued         09/29/23 2316 09/29/23 2314  Fundal & Lochia Check  Every Shift,   Status:  Canceled       09/29/23 2313    09/29/23 0745  fentaNYL 2mcg/mL and ropivacaine 0.2% in NS epidural 100mL  Continuous,   Status:  Discontinued         09/29/23 0649    09/29/23 0650  Urinary Catheter Care  Every Shift,   Status:  Canceled      See Hyperspace for full Linked Orders Report.    09/29/23 0649    09/29/23 0400  Vital Signs q 4 while awake  Every 4 Hours,   Status:  Canceled      Comments: While the patient is awake.    09/29/23 0025    09/29/23 0115  lactated ringers infusion   Continuous,   Status:  Discontinued         09/29/23 0025    09/29/23 0019  Position Change - For Intra-Uterine Resusitation for Hypertonus, HyperStimulation or Non-Reassuring Fetal Status  As Needed,   Status:  Canceled       09/29/23 0025    09/29/23 0000  Group B Streptococcus Culture - Swab, Vaginal/Rectum        Comments: This is an external result entered through the Results Console.      09/29/23 0148    09/29/23 0000  Antibody Screen        Comments: This is an external result entered through the Results Console.      09/29/23 0148    09/29/23 0000  ABO / Rh        Comments: This is an external result entered through the Results Console.      09/29/23 0148    09/29/23 0000  Hepatitis C Antibody        Comments: This is an external result entered through the Results Console.      09/29/23 0148    09/29/23 0000  Hepatitis B Surface Antigen        Comments: This is an external result entered through the Results Console.      09/29/23 0148    09/29/23 0000  RPR        Comments: This is an external result entered through the Results Console.      09/29/23 0148    09/29/23 0000  Rubella Antibody, IgG        Comments: This is an external result entered through the Results Console.      09/29/23 0148    09/29/23 0000  HIV-1 Antibody, EIA        Comments: This is an external result entered through the Results Console.      09/29/23 0148                  Operative/Procedure Notes (last 72 hours)  Notes from 09/30/23 1558 through 10/03/23 1558   No notes of this type exist for this encounter.       Physician Progress Notes (last 72 hours)  Notes from 09/30/23 1558 through 10/03/23 1558   No notes of this type exist for this encounter.       Consult Notes (last 72 hours)  Notes from 09/30/23 1558 through 10/03/23 1558   No notes of this type exist for this encounter.

## 2023-12-12 ENCOUNTER — OFFICE VISIT (OUTPATIENT)
Dept: FAMILY MEDICINE CLINIC | Age: 28
End: 2023-12-12
Payer: COMMERCIAL

## 2023-12-12 VITALS
BODY MASS INDEX: 38.94 KG/M2 | HEART RATE: 84 BPM | WEIGHT: 211.6 LBS | DIASTOLIC BLOOD PRESSURE: 66 MMHG | OXYGEN SATURATION: 96 % | HEIGHT: 62 IN | TEMPERATURE: 98.2 F | SYSTOLIC BLOOD PRESSURE: 128 MMHG

## 2023-12-12 DIAGNOSIS — R41.840 ATTENTION DEFICIT: ICD-10-CM

## 2023-12-12 DIAGNOSIS — Z23 IMMUNIZATION DUE: Primary | ICD-10-CM

## 2023-12-12 DIAGNOSIS — E66.9 OBESITY (BMI 35.0-39.9 WITHOUT COMORBIDITY): ICD-10-CM

## 2023-12-12 DIAGNOSIS — F41.9 ANXIETY: ICD-10-CM

## 2023-12-12 PROBLEM — D62 ANEMIA DUE TO ACUTE BLOOD LOSS: Status: RESOLVED | Noted: 2023-10-01 | Resolved: 2023-12-12

## 2023-12-12 PROBLEM — Z34.90 PREGNANCY: Status: RESOLVED | Noted: 2023-09-29 | Resolved: 2023-12-12

## 2023-12-12 NOTE — ASSESSMENT & PLAN NOTE
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 Their depression is currently active and the condition is newly identified. This will be reassessed in 2 weeks. F/U as described:patient was prescribed an antidepressant medicine, patient referred to Mental Health Specialist, and patient was given information regarding postpartum support international . Staff to call later this week to also check on pt. Significant other lives with pt and son. Has support from mother.

## 2023-12-12 NOTE — ASSESSMENT & PLAN NOTE
Zoloft initiated.  Potential side effects medication discussed.  Sending to psychiatry for further evaluation.

## 2023-12-12 NOTE — PROGRESS NOTES
"Chief Complaint  Establish Care and Mental Health Problem (\"I feel like I start many tasks, but don't finish many')    Subjective          Rolanda Neumann presents to [facility] wishing to establish care. Would like to discuss mental health issues.  Patient states she has been diagnosed with depression and anxiety in the past but feels as if she has an attention issue.  She states that she feels like she starts many tasks but cannot focus to finish any of them.  Patient is also 10 weeks postpartum after vaginally delivering her son.  She is nursing.  She has tried Prozac in the past as well as Wellbutrin with no improvement of symptoms.  She is very upset about having to go back to work for the first time after [place].  She does have a good support system at home with her significant other and her mother.  Patient denies any suicidal ideation or thoughts of harming herself, baby or others.    Patient is also concerned about weight gain even prior to pregnancy.  States the only way she can lose weight is by cutting calories to 1200 a day and working out almost daily.    Medical, surgical, family, social histories reviewed and updated in chart.      Over the last two weeks, how often have you been bothered by the following problems?  Feeling nervous, anxious or on edge: Several days  Not being able to stop or control worrying: Not at all  Worrying too much about different things: Several days  Trouble Relaxing: Several days  Being so restless that it is hard to sit still: Nearly every day  Becoming easily annoyed or irritable: Several days  Feeling afraid as if something awful might happen: Not at all  SARAH 7 Total Score: 7  If you checked any problems, how difficult have these problems made it for you to do your work, take care of things at home, or get along with other people: Somewhat difficult    PHQ-9 Depression Screening  Little interest or pleasure in doing things? 0-->not at " "all   Feeling down, depressed, or hopeless? 0-->not at all   Trouble falling or staying asleep, or sleeping too much?     Feeling tired or having little energy?     Poor appetite or overeating?     Feeling bad about yourself - or that you are a failure or have let yourself or your family down?     Trouble concentrating on things, such as reading the newspaper or watching television?     Moving or speaking so slowly that other people could have noticed? Or the opposite - being so fidgety or restless that you have been moving around a lot more than usual?     Thoughts that you would be better off dead, or of hurting yourself in some way?     PHQ-9 Total Score 0   If you checked off any problems, how difficult have these problems made it for you to do your work, take care of things at home, or get along with other people?           Objective   Vital Signs:   Vitals:    12/12/23 1315   BP: 128/66   BP Location: Left arm   Patient Position: Sitting   Cuff Size: Large Adult   Pulse: 84   Temp: 98.2 °F (36.8 °C)   TempSrc: Oral   SpO2: 96%   Weight: 96 kg (211 lb 9.6 oz)   Height: 157.5 cm (62.01\")       Physical Exam  Vitals reviewed.   Constitutional:       General: She is not in acute distress.     Appearance: She is well-developed.   HENT:      Head: Normocephalic and atraumatic.   Eyes:      Conjunctiva/sclera: Conjunctivae normal.      Pupils: Pupils are equal, round, and reactive to light.   Cardiovascular:      Rate and Rhythm: Normal rate and regular rhythm.      Heart sounds: Normal heart sounds. No murmur heard.  Pulmonary:      Effort: Pulmonary effort is normal. No respiratory distress.      Breath sounds: Normal breath sounds.   Skin:     General: Skin is warm and dry.   Neurological:      Mental Status: She is alert and oriented to person, place, and time.   Psychiatric:         Mood and Affect: Affect normal. Mood is anxious. Affect is tearful.         Thought Content: Thought content normal.         " Judgment: Judgment normal.      Comments: Patient was fidgeting and was bouncing the leg and avoiding eye contact at the beginning of the appointment/by end of appointment these have subsided somewhat.          Result Review :              Assessment and Plan    Diagnoses and all orders for this visit:    1. Immunization due (Primary)  -     Fluzone >6 Months (5410-4898)    2. Obesity (BMI 35.0-39.9 without comorbidity)  Assessment & Plan:  Patient's (Body mass index is 38.69 kg/m².) indicates that they are morbidly/severely obese (BMI > 40 or > 35 with obesity - related health condition) with health conditions that include none . Weight is newly identified. BMI  is above average; BMI management plan is completed. We discussed portion control, increasing exercise, and [employer] walking videos .  Also encouraged patient to give herself some nallely as she has just given birth.  We will discuss possible weight loss options once we have her anxiety/depression better controlled.  Would not want to start on too many medications at once.    Orders:  -     T3, Free; Future  -     T4, Free; Future  -     Comprehensive Metabolic Panel; Future  -     Lipid Panel; Future  -     Thyroid Antibodies; Future  -     TSH; Future  -     CBC Auto Differential; Future  -     Hemoglobin A1c; Future    3. Breast feeding status of mother  Comments:  Patient states she will make decision on whether or not to continue breast-feeding after meeting with psychiatry.  Orders:  -     Ambulatory Referral to Psychiatry    4. Anxiety  Assessment & Plan:  Zoloft initiated.  Potential side effects medication discussed.  Sending to psychiatry for further evaluation.    Orders:  -     sertraline (Zoloft) 50 MG tablet; Take 1 tablet by mouth Daily.  Dispense: 30 tablet; Refill: 1  -     Ambulatory Referral to Psychiatry    5. Postpartum depression  Assessment & Plan:  Patient's depression is recurrent and is mild without psychosis. Their depression  is currently active and the condition is newly identified. This will be reassessed in 2 weeks. F/U as described:patient was prescribed an antidepressant medicine, patient referred to Mental Health Specialist, and patient was given information regarding postpartum support international . Staff to call later this week to also check on pt. Significant other lives with pt and son. Has support from mother.     Orders:  -     sertraline (Zoloft) 50 MG tablet; Take 1 tablet by mouth Daily.  Dispense: 30 tablet; Refill: 1  -     Ambulatory Referral to Psychiatry    6. Attention deficit  Comments:  Sending to psychiatry for evaluation.  Orders:  -     Ambulatory Referral to Psychiatry    Other orders  -     Discontinue: sertraline (Zoloft) 50 MG tablet; Take 1 tablet by mouth Daily.  Dispense: 90 tablet; Refill: 1    Patient to notify office with any acute concerns or issues.  Patient verbalizes understanding, agrees with plan of care and has no further questions upon discharge.    Please note that portions of this note were completed with a voice recognition program.    Follow Up    Return in about 2 weeks (around 12/26/2023) for Recheck.  Patient was given instructions and counseling regarding her condition or for health maintenance advice. Please see specific information pulled into the AVS if appropriate.

## 2023-12-12 NOTE — ASSESSMENT & PLAN NOTE
Patient's (Body mass index is 38.69 kg/m².) indicates that they are morbidly/severely obese (BMI > 40 or > 35 with obesity - related health condition) with health conditions that include none . Weight is newly identified. BMI  is above average; BMI management plan is completed. We discussed portion control, increasing exercise, and [employer] walking videos .  Also encouraged patient to give herself some nallely as she has just given birth.  We will discuss possible weight loss options once we have her anxiety/depression better controlled.  Would not want to start on too many medications at once.

## 2023-12-15 ENCOUNTER — TELEPHONE (OUTPATIENT)
Dept: FAMILY MEDICINE CLINIC | Age: 28
End: 2023-12-15
Payer: COMMERCIAL

## 2023-12-15 NOTE — TELEPHONE ENCOUNTER
----- Message from SCHUYLER Carlos sent at 12/12/2023  6:06 PM EST -----  Please call patient on Friday morning to see how she is feeling.  See if her nerves are any better. pearl Valerio

## 2024-01-15 ENCOUNTER — OFFICE VISIT (OUTPATIENT)
Dept: BEHAVIORAL HEALTH | Facility: CLINIC | Age: 29
End: 2024-01-15
Payer: COMMERCIAL

## 2024-01-15 VITALS
HEIGHT: 62 IN | WEIGHT: 211.8 LBS | HEART RATE: 91 BPM | DIASTOLIC BLOOD PRESSURE: 84 MMHG | BODY MASS INDEX: 38.98 KG/M2 | SYSTOLIC BLOOD PRESSURE: 110 MMHG

## 2024-01-15 DIAGNOSIS — R41.840 ATTENTION AND CONCENTRATION DEFICIT: ICD-10-CM

## 2024-01-15 DIAGNOSIS — F33.0 MILD EPISODE OF RECURRENT MAJOR DEPRESSIVE DISORDER: ICD-10-CM

## 2024-01-15 DIAGNOSIS — F41.1 GENERALIZED ANXIETY DISORDER: Primary | ICD-10-CM

## 2024-01-15 PROCEDURE — 90792 PSYCH DIAG EVAL W/MED SRVCS: CPT | Performed by: NURSE PRACTITIONER

## 2024-01-15 RX ORDER — SULFAMETHOXAZOLE AND TRIMETHOPRIM 800; 160 MG/1; MG/1
1 TABLET ORAL EVERY 12 HOURS SCHEDULED
COMMUNITY
Start: 2024-01-13

## 2024-01-15 NOTE — PATIENT INSTRUCTIONS
"1. Should you want to get in touch with your provider, SCHUYLER Terry, please contact MY Medical Assistant, Shaina, directly at [phone].  Recommend saving Shaina's direct number in phone as this is the PREFERRED & EASIEST way to get in contact with your provider.  Please leave a voice mail if you do not get an answer and she will return your call within 24 hrs. You will NOT be able to contact provider on [employer] [place] Gameshart, as Behavioral Health Providers are restricted. YOU MUST CALL [phone]  If you need to speak with the on call provider after hours or on weekends, please Contact the [facility] ([phone]) and staff will be able to page the provider on call directly.     2.  In the event you need to cancel an appointment, please notify the office at least 24 hrs prior:   Contact **Shaina Medical Assistant at [facility] directly at [phone] or the [facility] ([phone])     3. MEDICATION REFILLS:  PLEASE CALL THE PHARMACY TO REQUEST ALL MEDICATION REFILLS or via Senseware TO ENSURE YOU ARE RECEIVING YOUR MEDICATIONS IN A TIMELY MANNER. The pharmacy or wiMAN josue will send this request ELECTRONICALLY to the ordering provider.   IF YOU USE AN AUTOMATED SERVICE AT THE PHARMACY FOR REFILLS AND ARE TOLD THERE ARE \"NO REFILLS REMAINING\"   PLEASE CALL THE PHARMACY & SPEAK TO A LIVE PERSON TO VERIFY IT IS THE MOST UP TO DATE PRESCRIPTION ON FILE.    All new prescriptions will have a different number, therefore, if you were given refills for a medication today or at last visit it will not have the same number as the previous prescription.     4.  In the event you have personal crisis, contact the following crisis numbers: Suicide Prevention Hotline 1-831.460.3798 or *988, [facility] Helpline 5-305-316-VHHC; [facility] [phone]; text HELLO to 337910; or 911.  If you feel like harming yourself or others, call 911 right away.  You can call the 982 Suicide and Crisis " "Lifeline at  988   to speak with a counselor at the lifeline, or you can connect with one using their online chat  .    5.  Never stop an antidepressant medicine without first talking to a healthcare provider. Suddenly stopping this type of medication can cause withdrawal symptoms.    6.  Counseling and talk therapy  Counseling or therapy teaches you new coping skills and more adaptive ways of thinking about problems. These tools can help you make positive changes. The benefits of counseling often last long after treatment sessions have stopped.    7.   We would appreciate your feedback, please scan the QRS code on the back of your appointment card (or see below) and complete a brief survey.  [place] location is still not available, so please click \"[place]\" location.  Thank you      SPECIFIC RECOMMENDATIONS:     1.      Medications discussed at this encounter:                   -  Continue Zoloft 50 mg daily    Patient informed that going forward refills of current psychotropic medications previously prescribed by PCP will now be managed by this provider, and to contact provider MA INITIALLY when down to 5-7 days remaining to ensure new refill(s) will have this provider name on prescription for future refills. Explained to patient if requested from current bottle, via Enerkemhart, or via pharmacy the request would be directed to ordering provider. And to prevent confusion, inaccurate dosing, inaccurate medication refills, the management of psychotropic and/or mental health medications should only be ordered by this mental health provider.     2.      Psychotherapy recommendations: Advised patient to contact insurance company to determine available therapist in area and/or check Babytree and filter results based on needs.  List of local counselors also given to patient and instructed to contact provider of choice to schedule an appointment.  And to contact provider if a referral order is needed.     "   3.     Return to clinic: 6 weeks, Thurs. 2/29/24 at 8 am     Counseling Services      [facility]  [address] [place] [address] [phone]  For appointments call, text, or email:  [email or web address]     [facility]  [address]   [place], [place]  [phone]     Geetha Reeves LCSW in [place], [place] providing telehealth services currently, will be opening an office in [place].    For appointments request online:[email or web address]    [place] [facility]   They offer telehealth and accept most insurance plans. It's easy for the patient to schedule through their website [email or web address]  Counseling and Psychiatry in [place]: Online or In-Person  [place] largest counseling center serves [place], [place], and the entire [facility] with therapy, psychiatry, and case management.     [facility]  [address]  [place] [address]   [phone]  [employer] Hospital[facility]     [facility]  [address] [place] [address] [phone]  [email or web address]  Offers Children, Adolescent, & Adult therapy services.   Offers EMDR (Eye Movement Desensitization & Reprocessing) to help process traumatic experiences     [facility]   [address]   [place] [address] [phone]  [place]  Specializing in the treatment of anxiety, depression, addiction and trauma, we work with children, teens, and adults in individual, couple, and family therapy.  Accept most commercial and Medicaid insurances and have private pay options.   Inez Goodwin, CHERELLE-S: Specializes in teen therapy, crisis intervention, women's issues, LGBTQIA+.   Bruno Smith LCSW, [facility]: Specializes in trauma and addiction; trained and certified in EMDR       [facility]  Address: [address], [place] [address]  Phone: [phone]  Website: [email or web address]  Services offered: Individual counseling for mood and anxiety disorders, PTSD and other trauma related issues, and substance-related disorders. Group counseling for anger management, domestic violence, parenting/parenting in recovery, and chemical dependence/substance abuse.  Insurance accepted: [place], Humana, Cigna, Aetna, , UMR, United. Accept all Medicaid, except for Aetna [facility] and [place] Medicaid.     [facility]  Address: [address] [address] [address] [place] [address], [phone]  Phone: [phone]  Website: wwwValence Technology  Services offered: Family, couples, and individual therapy for a wide variety of areas, such as anger management, LGBTQ support, crisis and conflict management, anxiety, depression, relationships, mood disorders, and more.  Insurance accepted: Medicaid, Medicare, and most commercial health insurances.    [facility]ara [facility]lvarez [facility]  Address: [address] [address]   Phone: [phone]    Services offered: Family, couples, and individual therapy for a wide variety of areas, such as mood disorders, personality disorders, psychosis, addiction, anxiety, coping skills, grief, trauma and PTSD, transgender, self-harming, suicidal ideation, and other. Multiple types of therapy offered, including dialectical, trauma focused, and more.  Insurance accepted: Carley Caba, Dora and Berna, [place], Humana, Medicaid, WellCare, Out of Network.    [facility]  Address: [address] [place] [address]  Phone: [phone] (11/4/21-temporary number until phone line fixed) [phone]  Services offered:  telehealth & in office visits, marriage & family therapy, art therapy, adult living skills, case management, applied behavioral analysis  therapy      [facility]  Address: [address], [place] [address]  Phone: [phone]  Website: [email or web address] Islandco[facility] Centerky.Education Everytime  Services offered: Individual and couples therapy, along with veterans group therapy and equine assisted psychotherapy. Providing service for depression, trauma, anxiety, abuse, phase of life changes, and more.  Insurance accepted: Aetna, [facility], [place] EAP, [facility]  and EAP Services, Cigna,  One Source, Optum/[facility]/UMR, .    Chan Jack, ADRYAN, [facility], [place] [address]  Phone: [phone]  Services offered: Individual and Group therapy, Alcohol use, anger management, anxiety, bipolar disorder, codependency, coping skills, depression, domestic abuse/violence, drug abuse, dual diagnosis, family conflict, grief, OCD, parenting, peer relationships, self-esteem stress, substance abuse, trauma & PTSD      [facility]  Address: [address], [place] [address] (office is rear entrance on [address]).  -Sydnee Epperson [phone]  -Dary Santos [facility]. [phone]  Website: www.[email or web address] Buy  Services offered: Psychotherapy and counseling for a wide variety of areas, such as depression, OCD, PTSD, and more. Neuropsychological testing  Insurance accepted: Accepts most insurance, including Medicare and Medicaid.      [facility]  Address: [address] [place] [address]  Phone: [phone]  Website: wwwBill-[email or web address] Home Mobility  Services offered: Individual, family, and group therapy. Also provide maternal mental health support.   Insurance accepted: [facility], Optum United, Cigna, Passport Medicaid, CareSource, Humana / [facility].    [facility]  Address: [address], [place] [facility] [place] [facility] [address]  Phone: [phone]  Website:  [email or web address]  Services offered: Counseling for anxiety, depression, trauma, guidance on mindfulness and meditation, and more.  Insurance accepted: Accepts most major insurance plans, health savings accounts, and private pay. Documentation can be provided for you to submit to your insurance company for possible reimbursement.      ADHD strategies/tips to help manage symptoms, with examples, which can be helpful in day to day life.  Remember health care professionals and medications can help manage symptoms, but they can only do so much.  You're the one who can make the most difference in overcoming them, everyone is different, and some of these will hopefully work    1) Use of visual reminders (sit empty box of kleenex near items you take for the day-work bag, keys, purse)     2)  Use of lists (short list for the week or day you want to achieve), color coding, manage forgetfulness by writing things down.     3)  Use multiple methods for reminders (calendar in phone with alarms, alarm alerts, write down on calendar, sticky notes, write down key words when a forgotten item comes to mind so you don't forget later)    4)  Learn to break tasks down into smaller steps and follow a systemic approach to organization. Realize some tasks can contain multiple tasks such as:  cleaning the kitchen is not just 1 simple task, it usually includes multiple tasks (washing dishes, putting away dishes, cleaning off counters, wiping out microwave, sweeping, mopping, etc)    5)  Minimize distractions that require a higher level of concentration.      6)  Use timers for those lengthy tasks to keep on task (such as using a timer for reading for 10 minutes, or when you find yourself easily distracted by other house hold or work tasks)     7)  Establish a routine    8)  Deal with mail on a daily basis so it doesn't accumulate and you don't miss some important notification.    9)  Use Auto-Pay or Auto-Reminders for Bill Pay.   "Go Paperless.    10)  Designate specific spaces for daily used items:  keys, purse, wallet, phone, bills     11)  Deal with it NOW.  Avoid forgetfulness, clutter, and procrastination by filing papers, cleaning up messes, returning calls or texts immediately, not some time in the future. If a task can be done in 2 minutes or less, do it on the spot, rather than putting it off for later.    12)  Give yourself more time than you need.  Plan to be early, give yourself 10 more minutes for every 30 minutes you estimate to complete a task, get somewhere.  Set reminder alarms to ensure you leave on time and make sure you have everything you need ahead of time so you're not frantically looking for your keys or phone when it's time to leave.     13)  Learn to say NO.  Impulsiveness can lead you to agree to projects, tasks, or making social engagements leaving you feeling overwhelmed, overtired, and affect the quality of your work.  Saying no to certain commitments may improve your ability to accomplish tasks, keep social dates, and live a healthier lifestyle.  Check your schedule first before agreeing.     14)  \"PIN\" Text messages to remind you to reply later, or to pay that bill reminder, etc.      Please arrive at least 15 minutes before your scheduled appointment time to complete check in process.      IF you are scheduled for a [employer] VIDEO visit, YOU MUST COMPLETE THE \"E-CHECK IN\" PROCESS PRIOR TO BEGINNING THE VISIT, YOU WILL NO LONGER RECEIVE A PHONE CALL PRIOR TO ALL VIDEO VISITS; You may still complete the E-Check in for in office visits prior to appointment, you will receive multiple text/email reminders which will direct you further if needed.           "

## 2024-01-15 NOTE — PROGRESS NOTES
Subjective   Rolanda Neumann is a 28 y.o. female who presents today for initial evaluation     Referring Provider:  Joanna John, APRN  [id number]  [place] [address]    Chief Complaint:  anxiety, depression, attention and concentration deficit    Answers submitted by the patient for this visit:  Other (Submitted on 2024)  Please describe your symptoms.: Constant worry. Starting multiple tasks and then just leaving them. feeling like i have to do things but getting overwhelmed by them and then just avoiding them (distracted?). Nervous? Overstimulated, troubke/avoiding socializing. I get irritable when theres too much going on. frustration in certain situations that make me just shut down. Etc.  Have you had these symptoms before?: Yes  How long have you been having these symptoms?: Greater than 2 weeks  Please list any medications you are currently taking for this condition.: Zoloft  Please describe any probable cause for these symptoms. : Mental/behavioral  Primary Reason for Visit (Submitted on 2024)  What is the primary reason for your visit?: Other      Provider introduced self, as well as credentials, and informed of provider role which will primarily include medication management of mental health conditions. Explained the difference between provider role vs. therapy/counseling services which include CBT (talk therapy) and do not include management of medications which are typically 45 minutes to 1 hr in length, however, if patient was in agreement to start therapy this provider can provide a contact list and/or refer. Patient verbalized understanding and agreed to proceed.    History of Present Illness:  Patient presents today in office with a history of anxiety, depression for which treatment began in .  Patient is currently prescribed Zoloft 50 mg (23), which have provided minimal effectiveness.  Patient has a PMHX of IBS.  Status post  23 at 40 weeks, 8  "lb 6 ounces baby boy, \"Ricky\", , patient is breastfeeding and formula feeding.  Patient plans to breast feed at least 6 months to 1 yr.       Patient goal of treatment \"I am unsure\"    Patient reports concerns of taking an antidepressant, and wanted to see a provieder in person vs internet as patient had seen a provider via [facility] in , and was not pleased.  Patient has suspicions of own based on family history or diagnosis.   Patient has not had Zoloft for the last few days due to not picking up refill, though has been taking since prescribed.      Patient reports planned pregnancy, denies feeling depressed throughout the pregnancy, though has some sad days, though primarily happy.  Patient admits to worrying about SIDS, sad and scared to leave baby at night, as patient works night shift.  Has been told having post partum depression, which patient disagrees. Denies thoughts of harm to baby, if feeling burnt out will go to mothers house and take a nap.  Patient does have help and is not afraid to voice need for help when needed.  Spouse works day shift.  Patient feels feelings are valid normal feelings.  Patient has been bonding with baby.     Patient admits to feeling overwhelmed, irritability.  This morning  had to run errands and patient mother is watching baby.  \"I get snippy.\" If baby is crying a lot and unable to console him, as he did have colic early on which resolved.  Patient reports baby likes to be held, and gets overwhelmed with the constant \"touching.\"  Baby will be transitioning from bassinet to crib which will be in patient and spouses room.  When patient is home baby is nursing, will get breast milk or formula in bottles sometimes. \"I so much do not like to be touched, especially the chest area, I am used to it now.\" Which is not new. Loves holding baby, touch from baby.   Sometimes with spouse \"I am not a very, I don't enjoy sexual activity very much, I don't like being touched " "very much.\"     All throughout school patient had difficulty concentrating, took twice as long to get things done.  2 yr college program took 6 yrs to complete, patient was only able to complete 1-2 classes per semester.  Admits to easily distracted.  \"Friends call me godfrey, your a goof ball, marko, made me feel not intelligent.\"   Mom and  say she is like her sister, and should see someone, which patient has been delaying, and now with having a kid, wants to be the best version of herself fo rher child. To understand self better.     ADHD:   Elementary school:   Grades: average grades; first 2 yrs of highschool struggled, last 2 yrs \"buckled down\" and college pre-requisites did okay, had to withdrawal from math due to poor grades (college algebra) and core classes-advertising/graphic design and made straight A's, took it very slow, able to apply self, encouragement from professors made her strive to do better.  Special classes or failures: never good at math, science was okay  Got in trouble:recalls \"chewing sticky tack, stupid stuff\" more of a hemanth boy, had male friends, \"I was rough and rowdy\" recalls being talked to about behavior should be more feminine.   Referral for ADHD testing:no  Fhx: sister (18 months younger) diagnosed in elementary school, also with Asperger's  Presently:  Problems with attention to detail: Sometimes fixes spelling errors if noticed,  Problems with sustained attention: Sometimes can be in longer conversations, which she prefers over small talk  Problems listening when spoken to directly: Yes (mind seems elsewhere, even in the absences of any obvious distraction) \"I have like an internal and external dialogue, I pay attention to everything around me, what I am having for lunch later, what I have to do later.\"   Failure to finish tasks: Yes-laundry puts off for days, and randomly will accomplish tasks; starts art or design project, or cleaning a room, for example getting " "everything out of closet, and loses interest, thinks will get to tomorrow and no longer has drive to complete, and items will sit for long duration and not put back. Will buy a bunch of stuff to make cookies, and then feels overwhelmed and doesn't bake the cookies.   Avoids/Dislikes/Reluctant to engage in tasks that require sustained mental effort: yes and no has been at [employer] for 5 yrs, and wont allow self to step away to do what she went to school for, due to fear of starting over, benefits, and current job is fairly easy. If had an assignment in school would put off, though if passionate about the assignment could work on for several hours at a time.   Easily distracted: Yes (unrelated thoughts, external factors)   Forgetting things: I feel my forgetfulness is used as a convenience, I avoid calls, and tell people I will call them back, but I don't and I don't feel bad about it.\"  Losing things: No, \"I am pretty good about not losing things.\"  Hard to organize: Yes- difficulty managing sequential tasks, keeping materials and belongings in order, messy, disorganized work, procrastinates; if in organizational mood will go back to fix things, has totes.   Talks a lot and cutting people off:Yes and sometimes, then has days and times of not wanting to talk, and gets irritated when others are trying to talk    Drifts off during conversations:Yes  Difficulty with Reading: No\"I like to read\" has a hard time finding the time, used to be able to finish a book in one day  Xiii. Difficulty watching TV/Movies: No, pays attention the first time watches, keeps tv running for background noise;     Depression: Patient complains of depression. She complains of anhedonia, depressed mood, difficulty concentrating, fatigue, feelings of worthlessness/guilt, and insomnia     Anxiety: The patient endorses significant symptoms of anxiety including:  worrisome,anxiousness, being easily fatigued, difficulty concentrating or mind going blank, " irritability, and sleep disturbance which have caused impairment in important areas of daily functioning.     The following portions of the patient's history were reviewed and updated as appropriate: allergies, current medications, past family history, past medical history, past social history, past surgical history and problem list.       PHQ-9 Depression Screening  PHQ-9 Total Score: (P) 11    Little interest or pleasure in doing things? (P) 1-->several days   Feeling down, depressed, or hopeless? (P) 1-->several days   Trouble falling or staying asleep, or sleeping too much? (P) 1-->several days   Feeling tired or having little energy? (P) 2-->more than half the days   Poor appetite or overeating? (P) 3-->nearly every day   Feeling bad about yourself - or that you are a failure or have let yourself or your family down? (P) 2-->more than half the days   Trouble concentrating on things, such as reading the newspaper or watching television? (P) 1-->several days   Moving or speaking so slowly that other people could have noticed? Or the opposite - being so fidgety or restless that you have been moving around a lot more than usual? (P) 0-->not at all   Thoughts that you would be better off dead, or of hurting yourself in some way? (P) 0-->not at all   PHQ-9 Total Score (P) 11     SARAH-7  Feeling nervous, anxious or on edge: (P) Several days  Not being able to stop or control worrying: (P) Several days  Worrying too much about different things: (P) Several days  Trouble Relaxing: (P) Several days  Being so restless that it is hard to sit still: (P) Not at all  Feeling afraid as if something awful might happen: (P) Several days  Becoming easily annoyed or irritable: (P) Several days  SARAH 7 Total Score: (P) 6  If you checked any problems, how difficult have these problems made it for you to do your work, take care of things at home, or get along with other people: (P) Not difficult at all    Past Surgical History:  Past  Surgical History:   Procedure Laterality Date    COLONOSCOPY      normal -2015    WISDOM TOOTH EXTRACTION         Problem List:  Patient Active Problem List   Diagnosis    Obesity (BMI 35.0-39.9 without comorbidity)    Anxiety    Postpartum depression       Allergy:   No Known Allergies     Discontinued Medications:  There are no discontinued medications.    Current Medications:   Current Outpatient Medications   Medication Sig Dispense Refill    mupirocin (BACTROBAN) 2 % ointment APPLY TOPICALLY TO THE AFFECTED AREA THREE TIMES DAILY FOR 7 DAYS      sertraline (Zoloft) 50 MG tablet Take 1 tablet by mouth Daily. 30 tablet 1    sulfamethoxazole-trimethoprim (BACTRIM DS,SEPTRA DS) 800-160 MG per tablet Take 1 tablet by mouth Every 12 (Twelve) Hours.       No current facility-administered medications for this visit.       Past Medical History:  Past Medical History:   Diagnosis Date    Anxiety     Depression     IBS (irritable bowel syndrome)     Kidney stone        Past Psychiatric History:  Began Treatment:   Diagnoses:Depression and Anxiety  Psychiatrist: [facility] provider   Therapist:Denies  Admission History:Denies  Medication Trials: Prozac and Wellbutrin combination--2023 due to pregnancy Took Wellbutrin several years ago for IBS    Self Harm: Denies  Suicide Attempts:Denies   Psychosis, Anxiety, Depression: Denies    Substance Abuse History:   Types:Denies all, including illicit  Withdrawal Symptoms:Denies  Longest Period Sober:Not Applicable   AA: Not applicable   Legal: n/a    Social History: As of 1/15/24  Martial Status:  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   History: Denies  Access to Guns:  Yes, locked    Social History     Socioeconomic History    Marital status:      Number of children: 1    Highest education level: Associate degree: academic program   Tobacco Use    Smoking status: Former     Packs/day: 0.25     Years: 1.00     Additional pack years: 0.00     Total pack years: 0.25     Types: Cigarettes     Quit date: 2012     Years since quittin.0     Passive exposure: Past    Smokeless tobacco: Never   Vaping Use    Vaping Use: Every day    Start date: 2021    Last attempt to quit: 2022    Substances: Nicotine, Flavoring    Devices: Disposable, Pre-filled or refillable cartridge, Pre-filled pod   Substance and Sexual Activity    Alcohol use: Not Currently     Alcohol/week: 2.0 standard drinks of alcohol     Types: 2 Glasses of wine per week    Drug use: Not Currently     Types: Marijuana     Comment: in high school 2012 tried once-didn't like it    Sexual activity: Not Currently     Partners: Male     Birth control/protection: None       Family History:   Suicide Attempts: Denies  Suicide Completions:Denies      Family History   Problem Relation Age of Onset    Diabetes Mother     Alcohol abuse Father     Diabetes Father     Depression Sister     Anxiety disorder Sister     ADD / ADHD Sister     Other Sister         ADHD    Thyroid disease Maternal Aunt     Thyroid disease Maternal Aunt     Thyroid disease Maternal Aunt     Cancer Maternal Grandmother         unsure of type    Cancer Paternal Grandfather         prostate and then another type    Cancer Paternal Grandmother         lung on 2 occasions       Developmental History:   Born: [place]  Siblings:1 sister  Childhood: Denies Abuse  High School:Completed  College: [employer] in [employer] and [employer] certificate    Mental Status Exam:   Hygiene:   good  Cooperation:  Cooperative  Eye Contact:  Good  Psychomotor Behavior:  Appropriate  Affect:  Appropriate  Mood: depressed and anxious  Speech:  Normal  Thought Process:  Goal directed  Thought Content:  Mood congruent  Suicidal:  None  Homicidal:   "None  Hallucinations:  None  Delusion:  None  Memory:  Intact  Orientation:  Grossly intact  Reliability:  good  Insight:  Good  Judgement:  Good  Impulse Control:  Good  Physical/Medical Issues:  No      Review of Systems:  Review of Systems   Constitutional:  Positive for fatigue.   Respiratory:  Negative for cough and shortness of breath.    Cardiovascular:  Negative for chest pain and palpitations.   Neurological:  Negative for seizures.   Psychiatric/Behavioral:  Positive for agitation and decreased concentration. Negative for suicidal ideas. The patient is nervous/anxious.         Irritability          Physical Exam:  Physical Exam  Psychiatric:         Attention and Perception: Attention and perception normal.         Mood and Affect: Mood and affect normal. Mood is anxious and depressed.         Speech: Speech normal.         Behavior: Behavior normal. Behavior is cooperative.         Thought Content: Thought content normal. Thought content does not include suicidal ideation. Thought content does not include suicidal plan.         Cognition and Memory: Cognition and memory normal.         Judgment: Judgment normal.         Vital Signs:   /84   Pulse 91   Ht 157.5 cm (62.01\")   Wt 96.1 kg (211 lb 12.8 oz)   BMI 38.73 kg/m²      Lab Results:   Admission on 09/29/2023, Discharged on 10/01/2023   Component Date Value Ref Range Status    Glucose 09/29/2023 85  65 - 99 mg/dL Final    BUN 09/29/2023 7  6 - 20 mg/dL Final    Creatinine 09/29/2023 0.55 (L)  0.57 - 1.00 mg/dL Final    Sodium 09/29/2023 137  136 - 145 mmol/L Final    Potassium 09/29/2023 4.0  3.5 - 5.2 mmol/L Final    Chloride 09/29/2023 104  98 - 107 mmol/L Final    CO2 09/29/2023 21.2 (L)  22.0 - 29.0 mmol/L Final    Calcium 09/29/2023 9.4  8.6 - 10.5 mg/dL Final    Total Protein 09/29/2023 6.7  6.0 - 8.5 g/dL Final    Albumin 09/29/2023 3.6  3.5 - 5.2 g/dL Final    ALT (SGPT) 09/29/2023 12  1 - 33 U/L Final    AST (SGOT) 09/29/2023 17  1 - " 32 U/L Final    Alkaline Phosphatase 09/29/2023 157 (H)  39 - 117 U/L Final    Total Bilirubin 09/29/2023 0.2  0.0 - 1.2 mg/dL Final    Globulin 09/29/2023 3.1  gm/dL Final    A/G Ratio 09/29/2023 1.2  g/dL Final    BUN/Creatinine Ratio 09/29/2023 12.7  7.0 - 25.0 Final    Anion Gap 09/29/2023 11.8  5.0 - 15.0 mmol/L Final    eGFR 09/29/2023 127.4  >60.0 mL/min/1.73 Final    ABO Type 09/29/2023 B   Final    RH type 09/29/2023 Positive   Final    Antibody Screen 09/29/2023 Negative   Final    T&S Expiration Date 09/29/2023 10/2/2023 11:59:59 PM   Final    WBC 09/29/2023 10.84 (H)  3.40 - 10.80 10*3/mm3 Final    RBC 09/29/2023 4.00  3.77 - 5.28 10*6/mm3 Final    Hemoglobin 09/29/2023 11.9 (L)  12.0 - 15.9 g/dL Final    Hematocrit 09/29/2023 35.5  34.0 - 46.6 % Final    MCV 09/29/2023 88.8  79.0 - 97.0 fL Final    MCH 09/29/2023 29.8  26.6 - 33.0 pg Final    MCHC 09/29/2023 33.5  31.5 - 35.7 g/dL Final    RDW 09/29/2023 14.4  12.3 - 15.4 % Final    RDW-SD 09/29/2023 45.7  37.0 - 54.0 fl Final    MPV 09/29/2023 10.3  6.0 - 12.0 fL Final    Platelets 09/29/2023 247  140 - 450 10*3/mm3 Final    Neutrophil % 09/29/2023 67.0  42.7 - 76.0 % Final    Lymphocyte % 09/29/2023 22.1  19.6 - 45.3 % Final    Monocyte % 09/29/2023 8.5  5.0 - 12.0 % Final    Eosinophil % 09/29/2023 1.0  0.3 - 6.2 % Final    Basophil % 09/29/2023 0.3  0.0 - 1.5 % Final    Immature Grans % 09/29/2023 1.1 (H)  0.0 - 0.5 % Final    Neutrophils, Absolute 09/29/2023 7.26 (H)  1.70 - 7.00 10*3/mm3 Final    Lymphocytes, Absolute 09/29/2023 2.40  0.70 - 3.10 10*3/mm3 Final    Monocytes, Absolute 09/29/2023 0.92 (H)  0.10 - 0.90 10*3/mm3 Final    Eosinophils, Absolute 09/29/2023 0.11  0.00 - 0.40 10*3/mm3 Final    Basophils, Absolute 09/29/2023 0.03  0.00 - 0.20 10*3/mm3 Final    Immature Grans, Absolute 09/29/2023 0.12 (H)  0.00 - 0.05 10*3/mm3 Final    nRBC 09/29/2023 0.2  0.0 - 0.2 /100 WBC Final    External Strep Group B Ag 08/31/2023 Positive   Final     External Antibody Screen 02/23/2023 Negative   Final    External ABO Grouping 02/23/2023 B   Final    External Rh Factor 02/23/2023 Positive   Final    External Hepatitis C Ab 02/23/2023 nonreactive   Final    External Hepatitis B Surface Ag 02/23/2023 Negative   Final    External RPR 02/23/2023 Non-Reactive   Final    External Rubella Qual 02/23/2023 Immune   Final    External HIV Antibody 02/23/2023 Non-Reactive   Final    WBC 09/30/2023 14.98 (H)  3.40 - 10.80 10*3/mm3 Final    RBC 09/30/2023 3.29 (L)  3.77 - 5.28 10*6/mm3 Final    Hemoglobin 09/30/2023 9.9 (L)  12.0 - 15.9 g/dL Final    Hematocrit 09/30/2023 29.3 (L)  34.0 - 46.6 % Final    MCV 09/30/2023 89.1  79.0 - 97.0 fL Final    MCH 09/30/2023 30.1  26.6 - 33.0 pg Final    MCHC 09/30/2023 33.8  31.5 - 35.7 g/dL Final    RDW 09/30/2023 14.0  12.3 - 15.4 % Final    RDW-SD 09/30/2023 45.4  37.0 - 54.0 fl Final    MPV 09/30/2023 9.9  6.0 - 12.0 fL Final    Platelets 09/30/2023 200  140 - 450 10*3/mm3 Final    Neutrophil % 09/30/2023 78.9 (H)  42.7 - 76.0 % Final    Lymphocyte % 09/30/2023 13.2 (L)  19.6 - 45.3 % Final    Monocyte % 09/30/2023 7.1  5.0 - 12.0 % Final    Eosinophil % 09/30/2023 0.2 (L)  0.3 - 6.2 % Final    Basophil % 09/30/2023 0.1  0.0 - 1.5 % Final    Immature Grans % 09/30/2023 0.5  0.0 - 0.5 % Final    Neutrophils, Absolute 09/30/2023 11.81 (H)  1.70 - 7.00 10*3/mm3 Final    Lymphocytes, Absolute 09/30/2023 1.98  0.70 - 3.10 10*3/mm3 Final    Monocytes, Absolute 09/30/2023 1.06 (H)  0.10 - 0.90 10*3/mm3 Final    Eosinophils, Absolute 09/30/2023 0.03  0.00 - 0.40 10*3/mm3 Final    Basophils, Absolute 09/30/2023 0.02  0.00 - 0.20 10*3/mm3 Final    Immature Grans, Absolute 09/30/2023 0.08 (H)  0.00 - 0.05 10*3/mm3 Final    nRBC 09/30/2023 0.0  0.0 - 0.2 /100 WBC Final       EKG Results:  No orders to display       Imaging Results:  No Images in the past 120 days found..      Assessment & Plan   Diagnoses and all orders for this visit:    1.  Generalized anxiety disorder (Primary)    2. Attention and concentration deficit    3. Mild episode of recurrent major depressive disorder        Visit Diagnoses:    ICD-10-CM ICD-9-CM   1. Generalized anxiety disorder  F41.1 300.02   2. Attention and concentration deficit  R41.840 799.51   3. Mild episode of recurrent major depressive disorder  F33.0 296.31       PLAN:  Safety: No acute safety concerns  Therapy:  prefers in person, though does not wish to drive out of [place]    Patient educated and encouraged to start therapy to develop new coping skills and more adaptive ways of thinking about problems. These tools can help make positive changes. The benefits of counseling often last long after treatment sessions have stopped.  Advised patient to contact insurance company to determine available therapist in area and/or check [facility] and filter results based on needs.  List of local counselors also given to patient and instructed to contact provider of choice to schedule an appointment.  And to contact provider if a referral order is needed.    Risk Assessment: Risk of self-harm acutely is moderate. Risk factors include anxiety disorder and mood disorder, access to guns/weapons.  Protective factors include no family history, no present SI, no history of suicide attempts or self-harm in the past, minimal AODA, healthcare seeking, future orientation, willingness to engage in care.  Risk of self-harm chronically is also moderate, but could be further elevated in the event of treatment noncompliance and/or AODA.  Meds:  Continue Zoloft 50 mg by mouth daily to target depression, anxiety.  Discussed all risks, benefits, alternatives, and side effects of Zoloft (Sertraline) including but not limited to GI upset, sexual dysfunction, bleeding risk, theoretical decrease of seizure threshold predisposing the patient to a slightly higher seizure risk, insomnia, sedation, dizziness, fatigue, drowsiness,  activation of orestes or hypomania, increased fragility fracture risk, hyponatremia, ocular effects, withdrawal syndrome following abrupt discontinuation, serotonin syndrome, and increased suicidality in patients 24 years and younger.  Patient educated on the need to practice safe sex while taking this medication. Discussed the need for patient to immediately call the office for any new or worsening symptoms, such as worsening depression; feeling nervous or restless; suicidal thoughts or actions; or other changes in mood or behavior, and all other concerns. Patient  educated on medication compliance and the risks of suddenly stopping this medication or missing doses. Patient verbalized understanding and is agreeable to taking Sertraline. Addressed all questions and concerns. Informed patient Antidepressants can take 4-6 weeks to start working and up to 2-3 months for the full benefits, usually people start feeling better in 2 weeks.  Patient informed Zoloft is safe in breastfeeding as it is present in the breastmilk at a concentration far less than 10% (the threshold).   Patient started Zoloft approximately 1 month ago, patient to  dose today as patient has not had the last 2 days.      Labs: n/a  Patient informed that going forward refills of current psychotropic medications previously prescribed by PCP will now be managed by this provider, and to contact provider MA INITIALLY when down to 5-7 days remaining to ensure new refill(s) will have this provider name on prescription for future refills. Explained to patient if requested from current bottle, via mychart, or via pharmacy the request would be directed to ordering provider. And to prevent confusion, inaccurate dosing, inaccurate medication refills, the management of psychotropic and/or mental health medications should only be ordered by this mental health provider. Patient verbalized understanding and agreed to proceed.        Advised patient to sign up  for text alerts with current pharmacy, which will inform patient when a medication is ready, cost of medication, and when a refill is needed.  Patient reports currently enrolled.    Patient informed if a medication is requested via telephone to office, MyChart, or with pharmacy directly, to check with their pharmacy (via phone, josue) or MyChart to check status of those requests before contacting the office.      Discussed and given patient the following education materials:  -Grounding Techniques, Cognitive distortions, 5 ways to defuse anxious thoughts, and What is Mindfulness with tips printout given to patient, provided by [employer] -How to Stop Over thinking Tips and coping strategies print out given to patient today from [facility].    10.  Will update Dr. Olivera of patient plan of care due to post partum.     Patient did report drivers license still has maiden name, patient is  and updated significant other to spouse on face sheet.     Patient presentation seems most consistent with anxiety, depression, and attention & concentration deficit.  Do not suspect post partum depression, as worries reported today are typical of a new mother and new baby at home.   Differential diagnosis include but not limited to social anxiety, adjustment reaction, personality disorder, neurodevelopmental disorder (ADHD,ASD, Intellectual disorder). Which will require further work up at future visits.   Patient does not meet all criteria for a diagnosis of ADHD, suspect symptoms are more related to anxiety and depression, discussed how symptoms of anxiety, depression, and ADHD can overlap or mimic each other, amongst other mental health diagnoses.   Patient was given instructions and counseling regarding condition and for health maintenance advice. Please see specific information pulled into the AVS if appropriate.   Patient to contact provider if symptoms worsen or fail to improve.        Patient screened positive  for depression based on a PHQ-9 score of 11 on 1/14/2024. Follow-up recommendations include: Suicide Risk Assessment performed and continue with current AD .       TREATMENT PLAN/GOALS: Continue supportive psychotherapy efforts and medications as indicated. Treatment and medication options discussed during today's visit. Patient acknowledged and verbally consented to continue with current treatment plan and was educated on the importance of compliance with treatment and follow-up appointments.    MEDICATION ISSUES:  ESAU reviewed as expected.  Discussed medication options and treatment plan of prescribed medication as well as the risks, benefits, and side effects including potential falls, possible impaired driving and metabolic adversities among others. Patient is agreeable to call the office with any worsening of symptoms or onset of side effects. Patient is agreeable to call 911 or go to the nearest ER should he/she begin having SI/HI. No medication side effects or related complaints today.     MEDS ORDERED DURING VISIT:  No orders of the defined types were placed in this encounter.      Return in about 6 weeks (around 2/26/2024) for medication check.         I spent 70 minutes caring for Rolanda on this date of service. This time includes time spent by me in the following activities: preparing for the visit, reviewing tests, obtaining and/or reviewing a separately obtained history, performing a medically appropriate examination and/or evaluation, counseling and educating the patient/family/caregiver, referring and communicating with other health care professionals, documenting information in the medical record, care coordination, and ADHD evaluation, and scheduling .      This document has been electronically signed by SCHUYLER Terry  January 15, 2024 11:30 EST           Part of this note may be an electronic transcription/translation of spoken language to printed text using the Dragon Dictation System.

## 2024-01-16 ENCOUNTER — TELEPHONE (OUTPATIENT)
Dept: FAMILY MEDICINE CLINIC | Age: 29
End: 2024-01-16
Payer: COMMERCIAL

## 2024-02-23 DIAGNOSIS — F41.9 ANXIETY: ICD-10-CM

## 2024-02-23 DIAGNOSIS — F33.0 MILD EPISODE OF RECURRENT MAJOR DEPRESSIVE DISORDER: ICD-10-CM

## 2024-02-23 DIAGNOSIS — F41.1 GENERALIZED ANXIETY DISORDER: Primary | ICD-10-CM

## 2024-02-26 ENCOUNTER — LAB (OUTPATIENT)
Dept: LAB | Facility: HOSPITAL | Age: 29
End: 2024-02-26
Payer: COMMERCIAL

## 2024-02-26 DIAGNOSIS — E66.9 OBESITY (BMI 35.0-39.9 WITHOUT COMORBIDITY): ICD-10-CM

## 2024-02-26 LAB
ALBUMIN SERPL-MCNC: 4.3 G/DL (ref 3.5–5.2)
ALBUMIN/GLOB SERPL: 1.3 G/DL
ALP SERPL-CCNC: 91 U/L (ref 39–117)
ALT SERPL W P-5'-P-CCNC: 27 U/L (ref 1–33)
ANION GAP SERPL CALCULATED.3IONS-SCNC: 13.7 MMOL/L (ref 5–15)
AST SERPL-CCNC: 18 U/L (ref 1–32)
BASOPHILS # BLD AUTO: 0.03 10*3/MM3 (ref 0–0.2)
BASOPHILS NFR BLD AUTO: 0.5 % (ref 0–1.5)
BILIRUB SERPL-MCNC: 0.3 MG/DL (ref 0–1.2)
BUN SERPL-MCNC: 16 MG/DL (ref 6–20)
BUN/CREAT SERPL: 21.1 (ref 7–25)
CALCIUM SPEC-SCNC: 9.7 MG/DL (ref 8.6–10.5)
CHLORIDE SERPL-SCNC: 101 MMOL/L (ref 98–107)
CHOLEST SERPL-MCNC: 239 MG/DL (ref 0–200)
CO2 SERPL-SCNC: 26.3 MMOL/L (ref 22–29)
CREAT SERPL-MCNC: 0.76 MG/DL (ref 0.57–1)
DEPRECATED RDW RBC AUTO: 41.8 FL (ref 37–54)
EGFRCR SERPLBLD CKD-EPI 2021: 108.3 ML/MIN/1.73
EOSINOPHIL # BLD AUTO: 0.09 10*3/MM3 (ref 0–0.4)
EOSINOPHIL NFR BLD AUTO: 1.4 % (ref 0.3–6.2)
ERYTHROCYTE [DISTWIDTH] IN BLOOD BY AUTOMATED COUNT: 14.3 % (ref 12.3–15.4)
GLOBULIN UR ELPH-MCNC: 3.2 GM/DL
GLUCOSE SERPL-MCNC: 101 MG/DL (ref 65–99)
HBA1C MFR BLD: 5.6 % (ref 4.8–5.6)
HCT VFR BLD AUTO: 41.8 % (ref 34–46.6)
HDLC SERPL-MCNC: 43 MG/DL (ref 40–60)
HGB BLD-MCNC: 13.7 G/DL (ref 12–15.9)
IMM GRANULOCYTES # BLD AUTO: 0.01 10*3/MM3 (ref 0–0.05)
IMM GRANULOCYTES NFR BLD AUTO: 0.2 % (ref 0–0.5)
LDLC SERPL CALC-MCNC: 164 MG/DL (ref 0–100)
LDLC/HDLC SERPL: 3.76 {RATIO}
LYMPHOCYTES # BLD AUTO: 2.51 10*3/MM3 (ref 0.7–3.1)
LYMPHOCYTES NFR BLD AUTO: 38.3 % (ref 19.6–45.3)
MCH RBC QN AUTO: 26.4 PG (ref 26.6–33)
MCHC RBC AUTO-ENTMCNC: 32.8 G/DL (ref 31.5–35.7)
MCV RBC AUTO: 80.5 FL (ref 79–97)
MONOCYTES # BLD AUTO: 0.44 10*3/MM3 (ref 0.1–0.9)
MONOCYTES NFR BLD AUTO: 6.7 % (ref 5–12)
NEUTROPHILS NFR BLD AUTO: 3.47 10*3/MM3 (ref 1.7–7)
NEUTROPHILS NFR BLD AUTO: 52.9 % (ref 42.7–76)
PLATELET # BLD AUTO: 257 10*3/MM3 (ref 140–450)
PMV BLD AUTO: 9 FL (ref 6–12)
POTASSIUM SERPL-SCNC: 4.2 MMOL/L (ref 3.5–5.2)
PROT SERPL-MCNC: 7.5 G/DL (ref 6–8.5)
RBC # BLD AUTO: 5.19 10*6/MM3 (ref 3.77–5.28)
SODIUM SERPL-SCNC: 141 MMOL/L (ref 136–145)
T3FREE SERPL-MCNC: 3.31 PG/ML (ref 2–4.4)
T4 FREE SERPL-MCNC: 0.98 NG/DL (ref 0.93–1.7)
TRIGL SERPL-MCNC: 172 MG/DL (ref 0–150)
TSH SERPL DL<=0.05 MIU/L-ACNC: 0.55 UIU/ML (ref 0.27–4.2)
VLDLC SERPL-MCNC: 32 MG/DL (ref 5–40)
WBC NRBC COR # BLD AUTO: 6.55 10*3/MM3 (ref 3.4–10.8)

## 2024-02-26 PROCEDURE — 80061 LIPID PANEL: CPT

## 2024-02-26 PROCEDURE — 83036 HEMOGLOBIN GLYCOSYLATED A1C: CPT

## 2024-02-26 PROCEDURE — 84439 ASSAY OF FREE THYROXINE: CPT

## 2024-02-26 PROCEDURE — 80050 GENERAL HEALTH PANEL: CPT

## 2024-02-26 PROCEDURE — 36415 COLL VENOUS BLD VENIPUNCTURE: CPT

## 2024-02-26 PROCEDURE — 84481 FREE ASSAY (FT-3): CPT

## 2024-02-26 PROCEDURE — 86800 THYROGLOBULIN ANTIBODY: CPT

## 2024-02-26 PROCEDURE — 86376 MICROSOMAL ANTIBODY EACH: CPT

## 2024-02-27 LAB
THYROGLOB AB SERPL-ACNC: 1.6 IU/ML (ref 0–0.9)
THYROPEROXIDASE AB SERPL-ACNC: 18 IU/ML (ref 0–34)

## 2024-02-29 ENCOUNTER — OFFICE VISIT (OUTPATIENT)
Dept: BEHAVIORAL HEALTH | Facility: CLINIC | Age: 29
End: 2024-02-29
Payer: COMMERCIAL

## 2024-02-29 VITALS
HEART RATE: 90 BPM | WEIGHT: 207.6 LBS | SYSTOLIC BLOOD PRESSURE: 110 MMHG | BODY MASS INDEX: 38.2 KG/M2 | HEIGHT: 62 IN | DIASTOLIC BLOOD PRESSURE: 78 MMHG

## 2024-02-29 DIAGNOSIS — F41.1 GENERALIZED ANXIETY DISORDER: ICD-10-CM

## 2024-02-29 DIAGNOSIS — Z79.899 MEDICATION MANAGEMENT: ICD-10-CM

## 2024-02-29 DIAGNOSIS — F33.0 MILD EPISODE OF RECURRENT MAJOR DEPRESSIVE DISORDER: Primary | ICD-10-CM

## 2024-02-29 NOTE — PROGRESS NOTES
Subjective   Rolanda Neumann is a 28 y.o. female who presents today for follow up    Referring Provider:  No referring provider defined for this encounter.    Chief Complaint:  anxiety, depression, medication management    History of Present Illness:   2/29/24:  Patient presents today in office, at last visit patient was advised to seek therapy and list of local counselors were given and patient was to continue dose of Zoloft 50 mg which was started early Jan. 2024.  Patient reports had missed a few days due to not picking up refill, and became extremely irritable and angry, which subsided after a few days of restarting.  Patient reports upon request of Zoloft last week, recalls the pharmacy at the clinic system was down and were not able to bill insurance, and significant other had to talk to pharmacy due to irritability and feeling poorly.     Patient continues to breast feed 5 month old son, denies negative intrusive thoughts of harm to baby or self, and if patient feels she can't calm baby down will have his father hold him to help.  Patient is bonding with baby well.     Patient has not reached out to therapy thus far, though plans to do so eventually.   Patient has lost 4 lbs since last appointment and reports intermittent fasting.  Patient had spoke with spouse about Wellbutrin, and seen pictures and videos of patient when she was taking, as she was happier.  And patient recalls patient was active, exercising, going out more, took care of skin, teeth, which has been minimal.  Patient has started to walk more, prepregnancy weight was 134 lbs and now 207, and is trying to lose weight, and feels the weight increase negatively impacts mood.  Reports past use of Wellbutrin and Phentermine and didn't experience side effects. Patient did express concern of serotonin syndrome.  Patient also tried Topamax at the weight loss clinic which patient did not tolerate, had difficulty with word finding, depth perception  "difficulties.    Initially prescribed Wellbutrin for weight loss and spouse seems to think it made a difference in mood.   Patient wishes to continue breast feeding as long as she can, though patient feels baby is using as more comfort, and doesn't feel he is getting adequate nutrients.  Baby is taking in rice cereal, jar baby food, and nurses at night, morning around 5 am upon patient return home from work, and before naps 2-3 times per day which varies, and is producing adequate milk supply.  \"He seems hungrier than usual, he is 19 lbs. I have no definite plan to stop or keep going, it depends on how it goes.\"    1/15/24:  INITIAL EVAL  Answers submitted by the patient for this visit:  Other (Submitted on 1/14/2024)  Please describe your symptoms.: Constant worry. Starting multiple tasks and then just leaving them. feeling like i have to do things but getting overwhelmed by them and then just avoiding them (distracted?). Nervous? Overstimulated, troubke/avoiding socializing. I get irritable when theres too much going on. frustration in certain situations that make me just shut down. Etc.  Have you had these symptoms before?: Yes  How long have you been having these symptoms?: Greater than 2 weeks  Please list any medications you are currently taking for this condition.: Zoloft  Please describe any probable cause for these symptoms. : Mental/behavioral  Primary Reason for Visit (Submitted on 1/14/2024)  What is the primary reason for your visit?: Other    Provider introduced self, as well as credentials, and informed of provider role which will primarily include medication management of mental health conditions. Explained the difference between provider role vs. therapy/counseling services which include CBT (talk therapy) and do not include management of medications which are typically 45 minutes to 1 hr in length, however, if patient was in agreement to start therapy this provider can provide a contact list and/or " "refer. Patient verbalized understanding and agreed to proceed.    Patient presents today in office with a history of anxiety, depression for which treatment began in .  Patient is currently prescribed Zoloft 50 mg (23), which have provided minimal effectiveness.  Patient has a PMHX of IBS.  Status post  23 at 40 weeks, 8 lb 6 ounces baby boy, \"Ricky\", , patient is breastfeeding and formula feeding.  Patient plans to breast feed at least 6 months to 1 yr.     Patient goal of treatment \"I am unsure\"    Patient reports concerns of taking an antidepressant, and wanted to see a provieder in person vs internet as patient had seen a provider via [facility] in , and was not pleased.  Patient has suspicions of own based on family history or diagnosis.   Patient has not had Zoloft for the last few days due to not picking up refill, though has been taking since prescribed.      Patient reports planned pregnancy, denies feeling depressed throughout the pregnancy, though has some sad days, though primarily happy.  Patient admits to worrying about SIDS, sad and scared to leave baby at night, as patient works night shift.  Has been told having post partum depression, which patient disagrees. Denies thoughts of harm to baby, if feeling burnt out will go to mothers house and take a nap.  Patient does have help and is not afraid to voice need for help when needed.  Spouse works day shift.  Patient feels feelings are valid normal feelings.  Patient has been bonding with baby.     Patient admits to feeling overwhelmed, irritability.  This morning  had to run errands and patient mother is watching baby.  \"I get snippy.\" If baby is crying a lot and unable to console him, as he did have colic early on which resolved.  Patient reports baby likes to be held, and gets overwhelmed with the constant \"touching.\"  Baby will be transitioning from bassinet to crib which will be in patient and spouses room.  When " "patient is home baby is nursing, will get breast milk or formula in bottles sometimes. \"I so much do not like to be touched, especially the chest area, I am used to it now.\" Which is not new. Loves holding baby, touch from baby.   Sometimes with spouse \"I am not a very, I don't enjoy sexual activity very much, I don't like being touched very much.\"     All throughout school patient had difficulty concentrating, took twice as long to get things done.  2 yr college program took 6 yrs to complete, patient was only able to complete 1-2 classes per semester.  Admits to easily distracted.  \"Friends call me airhead, your a goof ball, marko, made me feel not intelligent.\"   Mom and  say she is like her sister, and should see someone, which patient has been delaying, and now with having a kid, wants to be the best version of herself fo rher child. To understand self better.     ADHD:   Elementary school:   Grades: average grades; first 2 yrs of highschool struggled, last 2 yrs \"buckled down\" and college pre-requisites did okay, had to withdrawal from math due to poor grades (college algebra) and core classes-advertising/graphic design and made straight A's, took it very slow, able to apply self, encouragement from professors made her strive to do better.  Special classes or failures: never good at math, science was okay  Got in trouble:recalls \"chewing sticky tack, stupid stuff\" more of a hemanth boy, had male friends, \"I was rough and rowdy\" recalls being talked to about behavior should be more feminine.   Referral for ADHD testing:no  Fhx: sister (18 months younger) diagnosed in elementary school, also with Asperger's  Presently:  Problems with attention to detail: Sometimes fixes spelling errors if noticed,  Problems with sustained attention: Sometimes can be in longer conversations, which she prefers over small talk  Problems listening when spoken to directly: Yes (mind seems elsewhere, even in the absences of any " "obvious distraction) \"I have like an internal and external dialogue, I pay attention to everything around me, what I am having for lunch later, what I have to do later.\"   Failure to finish tasks: Yes-laundry puts off for days, and randomly will accomplish tasks; starts art or design project, or cleaning a room, for example getting everything out of closet, and loses interest, thinks will get to tomorrow and no longer has drive to complete, and items will sit for long duration and not put back. Will buy a bunch of stuff to make cookies, and then feels overwhelmed and doesn't bake the cookies.   Avoids/Dislikes/Reluctant to engage in tasks that require sustained mental effort: yes and no has been at [employer] for 5 yrs, and wont allow self to step away to do what she went to school for, due to fear of starting over, benefits, and current job is fairly easy. If had an assignment in school would put off, though if passionate about the assignment could work on for several hours at a time.   Easily distracted: Yes (unrelated thoughts, external factors)   Forgetting things: I feel my forgetfulness is used as a convenience, I avoid calls, and tell people I will call them back, but I don't and I don't feel bad about it.\"  Losing things: No, \"I am pretty good about not losing things.\"  Hard to organize: Yes- difficulty managing sequential tasks, keeping materials and belongings in order, messy, disorganized work, procrastinates; if in organizational mood will go back to fix things, has totes.   Talks a lot and cutting people off:Yes and sometimes, then has days and times of not wanting to talk, and gets irritated when others are trying to talk    Drifts off during conversations:Yes  Difficulty with Reading: No\"I like to read\" has a hard time finding the time, used to be able to finish a book in one day  Xiii. Difficulty watching TV/Movies: No, pays attention the first time watches, keeps tv running for background noise; "     Depression: Patient complains of depression. She complains of anhedonia, depressed mood, difficulty concentrating, fatigue, feelings of worthlessness/guilt, and insomnia     Anxiety: The patient endorses significant symptoms of anxiety including:  worrisome,anxiousness, being easily fatigued, difficulty concentrating or mind going blank, irritability, and sleep disturbance which have caused impairment in important areas of daily functioning.     The following portions of the patient's history were reviewed and updated as appropriate: allergies, current medications, past family history, past medical history, past social history, past surgical history and problem list.       PHQ-9 Depression Screening  PHQ-9 Total Score:  1/14/2024 11     Little interest or pleasure in doing things?     Feeling down, depressed, or hopeless?     Trouble falling or staying asleep, or sleeping too much?     Feeling tired or having little energy?     Poor appetite or overeating?     Feeling bad about yourself - or that you are a failure or have let yourself or your family down?     Trouble concentrating on things, such as reading the newspaper or watching television?     Moving or speaking so slowly that other people could have noticed? Or the opposite - being so fidgety or restless that you have been moving around a lot more than usual?     Thoughts that you would be better off dead, or of hurting yourself in some way?     PHQ-9 Total Score       SARAH-7   1/14/2024 6    Past Surgical History:  Past Surgical History:   Procedure Laterality Date    COLONOSCOPY      normal -2015    WISDOM TOOTH EXTRACTION         Problem List:  Patient Active Problem List   Diagnosis    Obesity (BMI 35.0-39.9 without comorbidity)    Anxiety    Postpartum depression       Allergy:   No Known Allergies     Discontinued Medications:  There are no discontinued medications.    Current Medications:   Current Outpatient Medications   Medication Sig Dispense  Refill    sertraline (Zoloft) 50 MG tablet Take 1 tablet by mouth Daily. 30 tablet 1    mupirocin (BACTROBAN) 2 % ointment APPLY TOPICALLY TO THE AFFECTED AREA THREE TIMES DAILY FOR 7 DAYS (Patient not taking: Reported on 2024)      sulfamethoxazole-trimethoprim (BACTRIM DS,SEPTRA DS) 800-160 MG per tablet Take 1 tablet by mouth Every 12 (Twelve) Hours. (Patient not taking: Reported on 2024)       No current facility-administered medications for this visit.       Past Medical History:  Past Medical History:   Diagnosis Date    Anxiety     Depression     IBS (irritable bowel syndrome)     Kidney stone        Past Psychiatric History:  Began Treatment:   Diagnoses:Depression and Anxiety  Psychiatrist: [facility] provider   Therapist:Denies  Admission History:Denies  Medication Trials: Prozac and Wellbutrin combination--2023 due to pregnancy Took Wellbutrin several years ago for IBS; initially given Wellbutrin for weight loss effective for mood and weight loss.    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   History: Denies  Access to Guns:  Yes, locked    Social History     Socioeconomic History    Marital status:     Number of children: 1    Highest education level: Associate degree: academic program   Tobacco Use    Smoking status: Former     Packs/day: 0.25     Years: 1.00     Additional pack years: 0.00     Total pack years: 0.25     Types: Cigarettes     Quit date: 2012     Years  since quittin.1     Passive exposure: Past    Smokeless tobacco: Never   Vaping Use    Vaping Use: Every day    Start date: 2021    Last attempt to quit: 2022    Substances: Nicotine, Flavoring    Devices: Disposable, Pre-filled or refillable cartridge, Pre-filled pod   Substance and Sexual Activity    Alcohol use: Not Currently     Alcohol/week: 2.0 standard drinks of alcohol     Types: 2 Glasses of wine per week    Drug use: Not Currently     Types: Marijuana     Comment: in high school 2012 tried once-didn't like it    Sexual activity: Not Currently     Partners: Male     Birth control/protection: None       Family History:   Suicide Attempts: Denies  Suicide Completions:Denies      Family History   Problem Relation Age of Onset    Diabetes Mother     Alcohol abuse Father     Diabetes Father     Depression Sister     Anxiety disorder Sister     ADD / ADHD Sister     Other Sister         ADHD    Thyroid disease Maternal Aunt     Thyroid disease Maternal Aunt     Thyroid disease Maternal Aunt     Cancer Maternal Grandmother         unsure of type    Cancer Paternal Grandfather         prostate and then another type    Cancer Paternal Grandmother         lung on 2 occasions       Developmental History:   Born: [place]  Siblings:1 sister  Childhood: Denies Abuse  High School:Completed  College: Associates in [employer] and [employer] certificate    Mental Status Exam:   Hygiene:   good  Cooperation:  Cooperative  Eye Contact:  Good  Psychomotor Behavior:  Appropriate  Affect:  Appropriate  Mood: euthymic   Speech:  Normal  Thought Process:  Goal directed  Thought Content:  Mood congruent  Suicidal:  None  Homicidal:  None  Hallucinations:  None  Delusion:  None  Memory:  Intact  Orientation:  Grossly intact  Reliability:  good  Insight:  Good  Judgement:  Good  Impulse Control:  Good  Physical/Medical Issues:  No      Review of Systems:  Review of Systems   Constitutional:  Positive for fatigue.  "  Respiratory:  Negative for cough and shortness of breath.    Cardiovascular:  Negative for chest pain and palpitations.   Neurological:  Negative for seizures.   Psychiatric/Behavioral:  Negative for decreased concentration, hallucinations and suicidal ideas. The patient is nervous/anxious. The patient is not hyperactive.          Physical Exam:  Physical Exam  Psychiatric:         Attention and Perception: Attention and perception normal.         Mood and Affect: Mood and affect normal.         Speech: Speech normal.         Behavior: Behavior normal. Behavior is cooperative.         Thought Content: Thought content normal. Thought content does not include suicidal ideation. Thought content does not include suicidal plan.         Cognition and Memory: Cognition and memory normal.         Judgment: Judgment normal.         Vital Signs:   /78   Pulse 90   Ht 157.5 cm (62.01\")   Wt 94.2 kg (207 lb 9.6 oz)   BMI 37.96 kg/m²      Lab Results: Reviewed  Lab on 02/26/2024   Component Date Value Ref Range Status    Hemoglobin A1C 02/26/2024 5.60  4.80 - 5.60 % Final    T3, Free 02/26/2024 3.31  2.00 - 4.40 pg/mL Final    Free T4 02/26/2024 0.98  0.93 - 1.70 ng/dL Final    Glucose 02/26/2024 101 (H)  65 - 99 mg/dL Final    BUN 02/26/2024 16  6 - 20 mg/dL Final    Creatinine 02/26/2024 0.76  0.57 - 1.00 mg/dL Final    Sodium 02/26/2024 141  136 - 145 mmol/L Final    Potassium 02/26/2024 4.2  3.5 - 5.2 mmol/L Final    Chloride 02/26/2024 101  98 - 107 mmol/L Final    CO2 02/26/2024 26.3  22.0 - 29.0 mmol/L Final    Calcium 02/26/2024 9.7  8.6 - 10.5 mg/dL Final    Total Protein 02/26/2024 7.5  6.0 - 8.5 g/dL Final    Albumin 02/26/2024 4.3  3.5 - 5.2 g/dL Final    ALT (SGPT) 02/26/2024 27  1 - 33 U/L Final    AST (SGOT) 02/26/2024 18  1 - 32 U/L Final    Alkaline Phosphatase 02/26/2024 91  39 - 117 U/L Final    Total Bilirubin 02/26/2024 0.3  0.0 - 1.2 mg/dL Final    Globulin 02/26/2024 3.2  gm/dL Final    A/G " Ratio 02/26/2024 1.3  g/dL Final    BUN/Creatinine Ratio 02/26/2024 21.1  7.0 - 25.0 Final    Anion Gap 02/26/2024 13.7  5.0 - 15.0 mmol/L Final    eGFR 02/26/2024 108.3  >60.0 mL/min/1.73 Final    Total Cholesterol 02/26/2024 239 (H)  0 - 200 mg/dL Final    Triglycerides 02/26/2024 172 (H)  0 - 150 mg/dL Final    HDL Cholesterol 02/26/2024 43  40 - 60 mg/dL Final    LDL Cholesterol  02/26/2024 164 (H)  0 - 100 mg/dL Final    VLDL Cholesterol 02/26/2024 32  5 - 40 mg/dL Final    LDL/HDL Ratio 02/26/2024 3.76   Final    Thyroid Peroxidase Antibody 02/26/2024 18  0 - 34 IU/mL Final    Thyroglobulin Ab 02/26/2024 1.6 (H)  0.0 - 0.9 IU/mL Final    Thyroglobulin Antibody measured by [facility]  It should be noted that the presence of thyroglobulin antibodies  may not be pathogenic nor diagnostic, especially at very low  levels. The assay  has found that four percent of  individuals without evidence of thyroid disease or autoimmunity  will have positive TgAb levels up to 4 IU/mL.    TSH 02/26/2024 0.548  0.270 - 4.200 uIU/mL Final    WBC 02/26/2024 6.55  3.40 - 10.80 10*3/mm3 Final    RBC 02/26/2024 5.19  3.77 - 5.28 10*6/mm3 Final    Hemoglobin 02/26/2024 13.7  12.0 - 15.9 g/dL Final    Hematocrit 02/26/2024 41.8  34.0 - 46.6 % Final    MCV 02/26/2024 80.5  79.0 - 97.0 fL Final    MCH 02/26/2024 26.4 (L)  26.6 - 33.0 pg Final    MCHC 02/26/2024 32.8  31.5 - 35.7 g/dL Final    RDW 02/26/2024 14.3  12.3 - 15.4 % Final    RDW-SD 02/26/2024 41.8  37.0 - 54.0 fl Final    MPV 02/26/2024 9.0  6.0 - 12.0 fL Final    Platelets 02/26/2024 257  140 - 450 10*3/mm3 Final    Neutrophil % 02/26/2024 52.9  42.7 - 76.0 % Final    Lymphocyte % 02/26/2024 38.3  19.6 - 45.3 % Final    Monocyte % 02/26/2024 6.7  5.0 - 12.0 % Final    Eosinophil % 02/26/2024 1.4  0.3 - 6.2 % Final    Basophil % 02/26/2024 0.5  0.0 - 1.5 % Final    Immature Grans % 02/26/2024 0.2  0.0 - 0.5 % Final    Neutrophils, Absolute  02/26/2024 3.47  1.70 - 7.00 10*3/mm3 Final    Lymphocytes, Absolute 02/26/2024 2.51  0.70 - 3.10 10*3/mm3 Final    Monocytes, Absolute 02/26/2024 0.44  0.10 - 0.90 10*3/mm3 Final    Eosinophils, Absolute 02/26/2024 0.09  0.00 - 0.40 10*3/mm3 Final    Basophils, Absolute 02/26/2024 0.03  0.00 - 0.20 10*3/mm3 Final    Immature Grans, Absolute 02/26/2024 0.01  0.00 - 0.05 10*3/mm3 Final   Admission on 09/29/2023, Discharged on 10/01/2023   Component Date Value Ref Range Status    Glucose 09/29/2023 85  65 - 99 mg/dL Final    BUN 09/29/2023 7  6 - 20 mg/dL Final    Creatinine 09/29/2023 0.55 (L)  0.57 - 1.00 mg/dL Final    Sodium 09/29/2023 137  136 - 145 mmol/L Final    Potassium 09/29/2023 4.0  3.5 - 5.2 mmol/L Final    Chloride 09/29/2023 104  98 - 107 mmol/L Final    CO2 09/29/2023 21.2 (L)  22.0 - 29.0 mmol/L Final    Calcium 09/29/2023 9.4  8.6 - 10.5 mg/dL Final    Total Protein 09/29/2023 6.7  6.0 - 8.5 g/dL Final    Albumin 09/29/2023 3.6  3.5 - 5.2 g/dL Final    ALT (SGPT) 09/29/2023 12  1 - 33 U/L Final    AST (SGOT) 09/29/2023 17  1 - 32 U/L Final    Alkaline Phosphatase 09/29/2023 157 (H)  39 - 117 U/L Final    Total Bilirubin 09/29/2023 0.2  0.0 - 1.2 mg/dL Final    Globulin 09/29/2023 3.1  gm/dL Final    A/G Ratio 09/29/2023 1.2  g/dL Final    BUN/Creatinine Ratio 09/29/2023 12.7  7.0 - 25.0 Final    Anion Gap 09/29/2023 11.8  5.0 - 15.0 mmol/L Final    eGFR 09/29/2023 127.4  >60.0 mL/min/1.73 Final    ABO Type 09/29/2023 B   Final    RH type 09/29/2023 Positive   Final    Antibody Screen 09/29/2023 Negative   Final    T&S Expiration Date 09/29/2023 10/2/2023 11:59:59 PM   Final    WBC 09/29/2023 10.84 (H)  3.40 - 10.80 10*3/mm3 Final    RBC 09/29/2023 4.00  3.77 - 5.28 10*6/mm3 Final    Hemoglobin 09/29/2023 11.9 (L)  12.0 - 15.9 g/dL Final    Hematocrit 09/29/2023 35.5  34.0 - 46.6 % Final    MCV 09/29/2023 88.8  79.0 - 97.0 fL Final    MCH 09/29/2023 29.8  26.6 - 33.0 pg Final    MCHC 09/29/2023 33.5   31.5 - 35.7 g/dL Final    RDW 09/29/2023 14.4  12.3 - 15.4 % Final    RDW-SD 09/29/2023 45.7  37.0 - 54.0 fl Final    MPV 09/29/2023 10.3  6.0 - 12.0 fL Final    Platelets 09/29/2023 247  140 - 450 10*3/mm3 Final    Neutrophil % 09/29/2023 67.0  42.7 - 76.0 % Final    Lymphocyte % 09/29/2023 22.1  19.6 - 45.3 % Final    Monocyte % 09/29/2023 8.5  5.0 - 12.0 % Final    Eosinophil % 09/29/2023 1.0  0.3 - 6.2 % Final    Basophil % 09/29/2023 0.3  0.0 - 1.5 % Final    Immature Grans % 09/29/2023 1.1 (H)  0.0 - 0.5 % Final    Neutrophils, Absolute 09/29/2023 7.26 (H)  1.70 - 7.00 10*3/mm3 Final    Lymphocytes, Absolute 09/29/2023 2.40  0.70 - 3.10 10*3/mm3 Final    Monocytes, Absolute 09/29/2023 0.92 (H)  0.10 - 0.90 10*3/mm3 Final    Eosinophils, Absolute 09/29/2023 0.11  0.00 - 0.40 10*3/mm3 Final    Basophils, Absolute 09/29/2023 0.03  0.00 - 0.20 10*3/mm3 Final    Immature Grans, Absolute 09/29/2023 0.12 (H)  0.00 - 0.05 10*3/mm3 Final    nRBC 09/29/2023 0.2  0.0 - 0.2 /100 WBC Final    External Strep Group B Ag 08/31/2023 Positive   Final    External Antibody Screen 02/23/2023 Negative   Final    External ABO Grouping 02/23/2023 B   Final    External Rh Factor 02/23/2023 Positive   Final    External Hepatitis C Ab 02/23/2023 nonreactive   Final    External Hepatitis B Surface Ag 02/23/2023 Negative   Final    External RPR 02/23/2023 Non-Reactive   Final    External Rubella Qual 02/23/2023 Immune   Final    External HIV Antibody 02/23/2023 Non-Reactive   Final    WBC 09/30/2023 14.98 (H)  3.40 - 10.80 10*3/mm3 Final    RBC 09/30/2023 3.29 (L)  3.77 - 5.28 10*6/mm3 Final    Hemoglobin 09/30/2023 9.9 (L)  12.0 - 15.9 g/dL Final    Hematocrit 09/30/2023 29.3 (L)  34.0 - 46.6 % Final    MCV 09/30/2023 89.1  79.0 - 97.0 fL Final    MCH 09/30/2023 30.1  26.6 - 33.0 pg Final    MCHC 09/30/2023 33.8  31.5 - 35.7 g/dL Final    RDW 09/30/2023 14.0  12.3 - 15.4 % Final    RDW-SD 09/30/2023 45.4  37.0 - 54.0 fl Final    MPV  09/30/2023 9.9  6.0 - 12.0 fL Final    Platelets 09/30/2023 200  140 - 450 10*3/mm3 Final    Neutrophil % 09/30/2023 78.9 (H)  42.7 - 76.0 % Final    Lymphocyte % 09/30/2023 13.2 (L)  19.6 - 45.3 % Final    Monocyte % 09/30/2023 7.1  5.0 - 12.0 % Final    Eosinophil % 09/30/2023 0.2 (L)  0.3 - 6.2 % Final    Basophil % 09/30/2023 0.1  0.0 - 1.5 % Final    Immature Grans % 09/30/2023 0.5  0.0 - 0.5 % Final    Neutrophils, Absolute 09/30/2023 11.81 (H)  1.70 - 7.00 10*3/mm3 Final    Lymphocytes, Absolute 09/30/2023 1.98  0.70 - 3.10 10*3/mm3 Final    Monocytes, Absolute 09/30/2023 1.06 (H)  0.10 - 0.90 10*3/mm3 Final    Eosinophils, Absolute 09/30/2023 0.03  0.00 - 0.40 10*3/mm3 Final    Basophils, Absolute 09/30/2023 0.02  0.00 - 0.20 10*3/mm3 Final    Immature Grans, Absolute 09/30/2023 0.08 (H)  0.00 - 0.05 10*3/mm3 Final    nRBC 09/30/2023 0.0  0.0 - 0.2 /100 WBC Final       EKG Results:  No orders to display       Imaging Results:  No Images in the past 120 days found..      Assessment & Plan   Diagnoses and all orders for this visit:    1. Mild episode of recurrent major depressive disorder (Primary)    2. Generalized anxiety disorder    3. Medication management          Visit Diagnoses:    ICD-10-CM ICD-9-CM   1. Mild episode of recurrent major depressive disorder  F33.0 296.31   2. Generalized anxiety disorder  F41.1 300.02   3. Medication management  Z79.899 V58.69         PLAN:  Safety: No acute safety concerns  Therapy:  prefers in person, though does not wish to drive out of [place]  has not reached out at time of visit.   Patient educated and encouraged to start therapy to develop new coping skills and more adaptive ways of thinking about problems. These tools can help make positive changes. The benefits of counseling often last long after treatment sessions have stopped.    Risk Assessment: Risk of self-harm acutely is moderate. Risk factors include anxiety disorder and mood disorder, access to  guns/weapons.  Protective factors include no family history, no present SI, no history of suicide attempts or self-harm in the past, minimal AODA, healthcare seeking, future orientation, willingness to engage in care.  Risk of self-harm chronically is also moderate, but could be further elevated in the event of treatment noncompliance and/or AODA.  Meds:  Continue Zoloft 50 mg by mouth daily to target depression, anxiety.  Discussed all risks, benefits, alternatives, and side effects of Zoloft (Sertraline) including but not limited to GI upset, sexual dysfunction, bleeding risk, theoretical decrease of seizure threshold predisposing the patient to a slightly higher seizure risk, insomnia, sedation, dizziness, fatigue, drowsiness, activation of orestes or hypomania, increased fragility fracture risk, hyponatremia, ocular effects, withdrawal syndrome following abrupt discontinuation, serotonin syndrome, and increased suicidality in patients 24 years and younger.  Patient educated on the need to practice safe sex while taking this medication. Discussed the need for patient to immediately call the office for any new or worsening symptoms, such as worsening depression; feeling nervous or restless; suicidal thoughts or actions; or other changes in mood or behavior, and all other concerns. Patient  educated on medication compliance and the risks of suddenly stopping this medication or missing doses. Patient verbalized understanding and is agreeable to taking Sertraline. Addressed all questions and concerns. Patient informed Zoloft is safe in breastfeeding as it is present in the breastmilk at a concentration far less than 10% (the threshold). NR needed  5.   Labs: n/a  6.   Will update Dr. Olivera of patient plan of care due to post partum.   7.  Patient instructed to request refills when appropriate, when down to 5-7 days remaining via  pharmacy or via MyChart.       Symptoms of anxiety, depression are under good control with  current medication regimen.  Discussed option to add Wellbutrin when no longer breast feeding.   Patient was given instructions and counseling regarding condition and for health maintenance advice. Please see specific information pulled into the AVS if appropriate.    Patient to contact provider if symptoms worsen or fail to improve.        1/15/24:   -Safety: No acute safety concerns  -Therapy: prefers in person, though does not wish to drive out of [place]   Patient educated and encouraged to start therapy to develop new coping skills and more adaptive ways of thinking about problems. These tools can help make positive changes. The benefits of counseling often last long after treatment sessions have stopped.  Advised patient to contact insurance company to determine available therapist in area and/or check [facility] and filter results based on needs.  List of local counselors also given to patient and instructed to contact provider of choice to schedule an appointment.  And to contact provider if a referral order is needed.      Continue Zoloft 50 mg by mouth daily to target depression, anxiety.  Discussed all risks, benefits, alternatives, and side effects of Zoloft (Sertraline) including but not limited to GI upset, sexual dysfunction, bleeding risk, theoretical decrease of seizure threshold predisposing the patient to a slightly higher seizure risk, insomnia, sedation, dizziness, fatigue, drowsiness, activation of orestes or hypomania, increased fragility fracture risk, hyponatremia, ocular effects, withdrawal syndrome following abrupt discontinuation, serotonin syndrome, and increased suicidality in patients 24 years and younger.  Patient educated on the need to practice safe sex while taking this medication. Discussed the need for patient to immediately call the office for any new or worsening symptoms, such as worsening depression; feeling nervous or restless; suicidal thoughts or actions; or other  changes in mood or behavior, and all other concerns. Patient  educated on medication compliance and the risks of suddenly stopping this medication or missing doses. Patient verbalized understanding and is agreeable to taking Sertraline. Addressed all questions and concerns. Informed patient Antidepressants can take 4-6 weeks to start working and up to 2-3 months for the full benefits, usually people start feeling better in 2 weeks.  Patient informed Zoloft is safe in breastfeeding as it is present in the breastmilk at a concentration far less than 10% (the threshold).   Patient started Zoloft approximately 1 month ago, patient to  dose today as patient has not had the last 2 days.     Patient informed that going forward refills of current psychotropic medications previously prescribed by PCP will now be managed by this provider, and to contact provider MA INITIALLY when down to 5-7 days remaining to ensure new refill(s) will have this provider name on prescription for future refills. Explained to patient if requested from current bottle, via mychart, or via pharmacy the request would be directed to ordering provider. And to prevent confusion, inaccurate dosing, inaccurate medication refills, the management of psychotropic and/or mental health medications should only be ordered by this mental health provider. Patient verbalized understanding and agreed to proceed.      -Advised patient to sign up for text alerts with current pharmacy, which will inform patient when a medication is ready, cost of medication, and when a refill is needed.  Patient reports currently enrolled.    -Patient informed if a medication is requested via telephone to office, MyChart, or with pharmacy directly, to check with their pharmacy (via phone, josue) or MyChart to check status of those requests before contacting the office.      Discussed and given patient the following education materials:  -Grounding Techniques, Cognitive  distortions, 5 ways to defuse anxious thoughts, and What is Mindfulness with tips printout given to patient, provided by [employer] -How to Stop Over thinking Tips and coping strategies print out given to patient today from [facility].      -Will update Dr. Olivera of patient plan of care due to post partum.     -Patient did report drivers license still has maiden name, patient is  and updated significant other to spouse on face sheet.     -Patient presentation seems most consistent with anxiety, depression, and attention & concentration deficit.  Do not suspect post partum depression, as worries reported today are typical of a new mother and new baby at home.   Differential diagnosis include but not limited to social anxiety, adjustment reaction, personality disorder, neurodevelopmental disorder (ADHD,ASD, Intellectual disorder). Which will require further work up at future visits.   Patient does not meet all criteria for a diagnosis of ADHD, suspect symptoms are more related to anxiety and depression, discussed how symptoms of anxiety, depression, and ADHD can overlap or mimic each other, amongst other mental health diagnoses.   Patient was given instructions and counseling regarding condition and for health maintenance advice. Please see specific information pulled into the AVS if appropriate.   Patient to contact provider if symptoms worsen or fail to improve.        Patient screened positive for depression based on a PHQ-9 score of 11 on 1/14/2024. Follow-up recommendations include: Suicide Risk Assessment performed and continue with current AD .       TREATMENT PLAN/GOALS: Continue supportive psychotherapy efforts and medications as indicated. Treatment and medication options discussed during today's visit. Patient acknowledged and verbally consented to continue with current treatment plan and was educated on the importance of compliance with treatment and follow-up appointments.    MEDICATION  ISSUES:  ESAU reviewed as expected. MANUAL ESAU 2/29/24 AT 0642 #[id number].  Discussed medication options and treatment plan of prescribed medication as well as the risks, benefits, and side effects including potential falls, possible impaired driving and metabolic adversities among others. Patient is agreeable to call the office with any worsening of symptoms or onset of side effects. Patient is agreeable to call 911 or go to the nearest ER should he/she begin having SI/HI. No medication side effects or related complaints today.     MEDS ORDERED DURING VISIT:  No orders of the defined types were placed in this encounter.      Return in about 3 months (around 5/29/2024) for medication check.         I spent 35 minutes caring for Rolanda on this date of service. This time includes time spent by me in the following activities: preparing for the visit, reviewing tests, performing a medically appropriate examination and/or evaluation, counseling and educating the patient/family/caregiver, referring and communicating with other health care professionals, documenting information in the medical record, care coordination, and scheduling .      This document has been electronically signed by SCHUYLER Terry  February 29, 2024 08:33 EST           Part of this note may be an electronic transcription/translation of spoken language to printed text using the Dragon Dictation System.

## 2024-02-29 NOTE — PATIENT INSTRUCTIONS
"1. Should you want to get in touch with your provider, SCHUYLER Terry, please contact MY Medical Assistant, Shaina, directly at [phone].  Recommend saving Shaina's direct number in phone as this is the PREFERRED & EASIEST way to get in contact with your provider.  Please leave a voice mail if you do not get an answer and she will return your call within 24 hrs. You will NOT be able to contact provider on [employer], as Behavioral Health Providers are restricted. YOU MUST CALL [phone]  If you need to speak with the on call provider after hours or on weekends, please Contact the [facility] ([phone]) and staff will be able to page the provider on call directly.     2.  In the event you need to cancel an appointment, please notify the office at least 24 hrs prior:   Contact **Shaina Medical Assistant at [facility] directly at [phone] or the [facility] ([phone])     3. MEDICATION REFILLS:  PLEASE CALL THE PHARMACY TO REQUEST ALL MEDICATION REFILLS or via eReceipts TO ENSURE YOU ARE RECEIVING YOUR MEDICATIONS IN A TIMELY MANNER. The pharmacy or [employer] josue will send this request ELECTRONICALLY to the ordering provider.   IF YOU USE AN AUTOMATED SERVICE AT THE PHARMACY FOR REFILLS AND ARE TOLD THERE ARE \"NO REFILLS REMAINING\"   PLEASE CALL THE PHARMACY & SPEAK TO A LIVE PERSON TO VERIFY IT IS THE MOST UP TO DATE PRESCRIPTION ON FILE.    All new prescriptions will have a different number, therefore, if you were given refills for a medication today or at last visit it will not have the same number as the previous prescription.     4.  In the event you have personal crisis, contact the following crisis numbers: Suicide Prevention Hotline 1-756.629.4725 or *988, [facility] Helpline 0-239-785-GHXP; [facility] Emergency Room [phone]; text HELLO to 081417; or 911.  If you feel like harming yourself or others, call 911 right away.  You can call the 98 Suicide and Crisis " "Lifeline at  988   to speak with a counselor at the [facility], or you can connect with one using their online chat  .    5.  Never stop an antidepressant medicine without first talking to a healthcare provider. Suddenly stopping this type of medication can cause withdrawal symptoms.    6.  Counseling and talk therapy  Counseling or therapy teaches you new coping skills and more adaptive ways of thinking about problems. These tools can help you make positive changes. The benefits of counseling often last long after treatment sessions have stopped.    7.   We would appreciate your feedback, please scan the QRS code on the back of your appointment card (or see below) and complete a brief survey.  [place] location is still not available, so please click \"[place]\" location.  Thank you      SPECIFIC RECOMMENDATIONS:     1.      Medications discussed at this encounter:                   -  Patient instructed to request refills when appropriate, when down to 5-7 days remaining via  pharmacy or via [employer].       2.      Psychotherapy recommendations: Continue to search for therapist.     3.     Return to clinic: 3 months, Thurs 5/16/24 at 8 am    Please arrive at least 15 minutes before your scheduled appointment time to complete check in process.      IF you are scheduled for a [employer] VIDEO visit, YOU MUST COMPLETE THE \"E-CHECK IN\" PROCESS PRIOR TO BEGINNING THE VISIT, YOU WILL NO LONGER RECEIVE A PHONE CALL PRIOR TO ALL VIDEO VISITS; You may still complete the E-Check in for in office visits prior to appointment, you will receive multiple text/email reminders which will direct you further if needed.           "

## 2024-03-12 ENCOUNTER — OFFICE VISIT (OUTPATIENT)
Dept: FAMILY MEDICINE CLINIC | Age: 29
End: 2024-03-12
Payer: COMMERCIAL

## 2024-03-12 VITALS
HEIGHT: 62 IN | OXYGEN SATURATION: 97 % | HEART RATE: 77 BPM | DIASTOLIC BLOOD PRESSURE: 68 MMHG | WEIGHT: 207.8 LBS | SYSTOLIC BLOOD PRESSURE: 106 MMHG | TEMPERATURE: 97.8 F | BODY MASS INDEX: 38.24 KG/M2

## 2024-03-12 DIAGNOSIS — E78.2 MIXED HYPERLIPIDEMIA: ICD-10-CM

## 2024-03-12 DIAGNOSIS — R94.6 ABNORMAL THYROID EXAM: ICD-10-CM

## 2024-03-12 DIAGNOSIS — E66.01 CLASS 2 SEVERE OBESITY DUE TO EXCESS CALORIES WITH SERIOUS COMORBIDITY AND BODY MASS INDEX (BMI) OF 38.0 TO 38.9 IN ADULT: Primary | ICD-10-CM

## 2024-03-12 PROCEDURE — 99214 OFFICE O/P EST MOD 30 MIN: CPT | Performed by: NURSE PRACTITIONER

## 2024-03-12 NOTE — PROGRESS NOTES
"Chief Complaint  Obesity (Weight loss) and Review Labs    Subjective          Rolanda Neumann presents to [facility] to review labs and discuss weight loss options. Pt's cholesterol was elevated, as well as a thyroid antibody. All other thyroid testing was normal. States she is fatigued, but no other symptoms of abnormal thyroid. She is still nursing. She has taken phentermine in the past and tolerated well with success.       Objective   Vital Signs:   Vitals:    03/12/24 0911   BP: 106/68   BP Location: Right arm   Patient Position: Sitting   Cuff Size: Large Adult   Pulse: 77   Temp: 97.8 °F (36.6 °C)   TempSrc: Oral   SpO2: 97%   Weight: 94.3 kg (207 lb 12.8 oz)   Height: 157.5 cm (62.01\")              Physical Exam  Vitals reviewed.   Constitutional:       General: She is not in acute distress.     Appearance: She is well-developed. She is obese.   HENT:      Head: Normocephalic and atraumatic.   Eyes:      Conjunctiva/sclera: Conjunctivae normal.      Pupils: Pupils are equal, round, and reactive to light.   Cardiovascular:      Rate and Rhythm: Normal rate and regular rhythm.      Heart sounds: Normal heart sounds. No murmur heard.  Pulmonary:      Effort: Pulmonary effort is normal. No respiratory distress.      Breath sounds: Normal breath sounds.   Skin:     General: Skin is warm and dry.   Neurological:      Mental Status: She is alert and oriented to person, place, and time.   Psychiatric:         Mood and Affect: Mood and affect normal.         Behavior: Behavior normal.         Thought Content: Thought content normal.         Judgment: Judgment normal.            Lab Results   Component Value Date    GLUCOSE 101 (H) 02/26/2024    BUN 16 02/26/2024    CREATININE 0.76 02/26/2024    EGFR 108.3 02/26/2024    BCR 21.1 02/26/2024    K 4.2 02/26/2024    CO2 26.3 02/26/2024    CALCIUM 9.7 02/26/2024    ALBUMIN 4.3 02/26/2024    BILITOT 0.3 02/26/2024    AST 18 02/26/2024    ALT " 27 02/26/2024     Lab Results   Component Value Date    HGBA1C 5.60 02/26/2024     Lab Results   Component Value Date    WBC 6.55 02/26/2024    HGB 13.7 02/26/2024    HCT 41.8 02/26/2024    MCV 80.5 02/26/2024     02/26/2024     Lab Results   Component Value Date    CHOL 239 (H) 02/26/2024     Lab Results   Component Value Date    TRIG 172 (H) 02/26/2024     Lab Results   Component Value Date    HDL 43 02/26/2024     Lab Results   Component Value Date     (H) 02/26/2024     Lab Results   Component Value Date    TSH 0.548 02/26/2024     Thyroid Antibodies (02/26/2024 09:03)          Assessment and Plan    Assessment & Plan  Class 2 severe obesity due to excess calories with serious comorbidity and body mass index (BMI) of 38.0 to 38.9 in adult  Patient's (Body mass index is 38 kg/m².) indicates that they are morbidly/severely obese (BMI > 40 or > 35 with obesity - related health condition) with health conditions that include dyslipidemias . Weight is unchanged. BMI  is above average; BMI management plan is completed. We discussed portion control, increasing exercise, Weight Watchers or other Commercial based weight reduction program, pharmacologic options including phentermine or GLP1, and an josue-based approach such as [employer] Sports[employer] Pal or Lose It.   Abnormal thyroid exam  Retesting. Will notify patient of results and treat accordingly.     Mixed hyperlipidemia    Handouts given to pt regarding foods to use or monitor. Weight loss will naturally help, as well as routine exercise.     Orders Placed This Encounter   Procedures    Thyroid Antibodies    TSH Rfx On Abnormal To Free T4            Patient to notify office with any acute concerns or issues.  Patient verbalizes understanding, agrees with plan of care and has no further questions upon discharge.    Please note that portions of this note were completed with a voice recognition program.      Follow Up    Return in about 4 weeks (around 4/9/2024) for  Recheck.  Patient was given instructions and counseling regarding her condition or for health maintenance advice. Please see specific information pulled into the AVS if appropriate.

## 2024-04-03 ENCOUNTER — LAB (OUTPATIENT)
Dept: LAB | Facility: HOSPITAL | Age: 29
End: 2024-04-03
Payer: COMMERCIAL

## 2024-04-03 DIAGNOSIS — R94.6 ABNORMAL THYROID EXAM: ICD-10-CM

## 2024-04-03 LAB — TSH SERPL DL<=0.05 MIU/L-ACNC: 1.18 UIU/ML (ref 0.27–4.2)

## 2024-04-03 PROCEDURE — 36415 COLL VENOUS BLD VENIPUNCTURE: CPT

## 2024-04-03 PROCEDURE — 86800 THYROGLOBULIN ANTIBODY: CPT

## 2024-04-03 PROCEDURE — 86376 MICROSOMAL ANTIBODY EACH: CPT

## 2024-04-03 PROCEDURE — 84443 ASSAY THYROID STIM HORMONE: CPT

## 2024-04-04 LAB
THYROGLOB AB SERPL-ACNC: 2.9 IU/ML (ref 0–0.9)
THYROPEROXIDASE AB SERPL-ACNC: 19 IU/ML (ref 0–34)

## 2024-04-09 ENCOUNTER — TELEPHONE (OUTPATIENT)
Dept: FAMILY MEDICINE CLINIC | Age: 29
End: 2024-04-09
Payer: COMMERCIAL

## 2024-04-09 NOTE — TELEPHONE ENCOUNTER
Caller: Rolanda Neumann    Relationship to patient: Self    Best call back number: [phone]    Patient is needing: PATIENT CALLED IN AND IS REQUESTING A CALL BACK REGARDING LATEST TEST RESULTS FOR THYROID. PATIENT SAID IT IS OKAY TO LEAVE A MESSAGE ON PHONE. PATIENT WAS UNSURE IF SHE WOULD BE NEEDING A FOLLOW UP VISIT.

## 2024-04-10 ENCOUNTER — PATIENT MESSAGE (OUTPATIENT)
Dept: FAMILY MEDICINE CLINIC | Age: 29
End: 2024-04-10
Payer: COMMERCIAL

## 2024-04-10 DIAGNOSIS — E06.3 AUTOIMMUNE THYROIDITIS: Primary | ICD-10-CM

## 2024-04-11 RX ORDER — LEVOTHYROXINE SODIUM 0.05 MG/1
50 TABLET ORAL
Qty: 90 TABLET | Refills: 1 | Status: SHIPPED | OUTPATIENT
Start: 2024-04-11

## 2024-04-11 NOTE — TELEPHONE ENCOUNTER
From: Rolanda Neumann  To: Joanna John  Sent: 4/10/2024 3:32 AM EDT  Subject: Test Results    Hi,   Thank you for taking the time to get back to me. 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 I still experience extreme fatigue as well as joint pain, stiffness, and soreness. I still experience depression despite being on Zoloft (going to discuss the possibility of changing meds), and of course the weight gain we talked about. My hair isn’t falling out per se, but it’s very brittle and dry.   With family history of thyroid issues (hypothyroidism) I am wanting to give medication a try.   I also talked to [facility] about Wegovy, Saxenda, and Zepbound and they told me that they do not cover weight loss treatment, unfortunately.  
no

## 2024-04-17 DIAGNOSIS — F41.1 GENERALIZED ANXIETY DISORDER: ICD-10-CM

## 2024-04-17 DIAGNOSIS — F33.0 MILD EPISODE OF RECURRENT MAJOR DEPRESSIVE DISORDER: ICD-10-CM

## 2024-04-30 ENCOUNTER — OFFICE VISIT (OUTPATIENT)
Dept: FAMILY MEDICINE CLINIC | Age: 29
End: 2024-04-30
Payer: COMMERCIAL

## 2024-04-30 VITALS
HEART RATE: 101 BPM | TEMPERATURE: 99.9 F | HEIGHT: 62 IN | WEIGHT: 208.6 LBS | DIASTOLIC BLOOD PRESSURE: 62 MMHG | BODY MASS INDEX: 38.39 KG/M2 | SYSTOLIC BLOOD PRESSURE: 110 MMHG | OXYGEN SATURATION: 95 %

## 2024-04-30 DIAGNOSIS — R52 BODY ACHES: ICD-10-CM

## 2024-04-30 DIAGNOSIS — N61.0 MASTITIS, LEFT, ACUTE: Primary | ICD-10-CM

## 2024-04-30 LAB
EXPIRATION DATE: NORMAL
FLUAV AG UPPER RESP QL IA.RAPID: NOT DETECTED
FLUBV AG UPPER RESP QL IA.RAPID: NOT DETECTED
INTERNAL CONTROL: NORMAL
Lab: NORMAL
SARS-COV-2 AG UPPER RESP QL IA.RAPID: NOT DETECTED

## 2024-04-30 PROCEDURE — 87428 SARSCOV & INF VIR A&B AG IA: CPT | Performed by: PHYSICIAN ASSISTANT

## 2024-04-30 PROCEDURE — 99213 OFFICE O/P EST LOW 20 MIN: CPT | Performed by: PHYSICIAN ASSISTANT

## 2024-04-30 RX ORDER — CEPHALEXIN 500 MG/1
500 CAPSULE ORAL 4 TIMES DAILY
Qty: 28 CAPSULE | Refills: 0 | Status: SHIPPED | OUTPATIENT
Start: 2024-04-30 | End: 2024-05-07

## 2024-04-30 NOTE — PROGRESS NOTES
Subjective     CHIEF COMPLAINT    Chief Complaint   Patient presents with    Generalized Body Aches     Onset yesterday.     Headache    Chills    Breast Pain     (L), sore and swollen. Pt states pain radiates down (R) side. Onset Sunday night. Pt stopped breast feeding 3 weeks ago.             History of Present Illness  This is a 28-year-old female presenting to the clinic complaining of left breast pain for the last 2 days.  Last night her symptoms worsened significantly with chills, sweats, subjective fever and bodyaches.  She reports a mild sore throat and cough but states she would not of come to the doctor for just those.  She denies any known sick contacts recently.  She stopped breast-feeding about 3 weeks ago.            Review of Systems   Constitutional:  Positive for chills, diaphoresis and fever (subjective). Negative for fatigue.   HENT:  Positive for sore throat. Negative for rhinorrhea.    Respiratory:  Positive for cough. Negative for shortness of breath and wheezing.    Cardiovascular:  Positive for chest pain (left breast pain).   Gastrointestinal:  Negative for abdominal pain, diarrhea, nausea and vomiting.   Musculoskeletal:  Positive for myalgias.   Skin:  Negative for rash.   Neurological:  Positive for headaches.            Past Medical History:   Diagnosis Date    Anxiety     Depression     IBS (irritable bowel syndrome)     Kidney stone             Past Surgical History:   Procedure Laterality Date    COLONOSCOPY      normal -2015    WISDOM TOOTH EXTRACTION              Family History   Problem Relation Age of Onset    Diabetes Mother     Alcohol abuse Father     Diabetes Father     Depression Sister     Anxiety disorder Sister     ADD / ADHD Sister     Other Sister         ADHD    Thyroid disease Maternal Aunt     Thyroid disease Maternal Aunt     Thyroid disease Maternal Aunt     Cancer Maternal Grandmother         unsure of type    Cancer Paternal Grandfather         prostate and then  "another type    Cancer Paternal Grandmother         lung on 2 occasions            Social History     Socioeconomic History    Marital status:     Number of children: 1    Highest education level: Associate degree: academic program   Tobacco Use    Smoking status: Former     Current packs/day: 0.00     Average packs/day: 0.3 packs/day for 1 year (0.3 ttl pk-yrs)     Types: Cigarettes     Start date: 2011     Quit date: 2012     Years since quittin.3     Passive exposure: Past    Smokeless tobacco: Never   Vaping Use    Vaping status: Every Day    Start date: 2021    Substances: Nicotine, Flavoring    Devices: Disposable, Pre-filled or refillable cartridge, Pre-filled pod   Substance and Sexual Activity    Alcohol use: Not Currently     Alcohol/week: 2.0 standard drinks of alcohol     Types: 2 Glasses of wine per week    Drug use: Not Currently     Types: Marijuana     Comment: in high school  tried once-didn't like it    Sexual activity: Not Currently     Partners: Male     Birth control/protection: None            No Known Allergies         Current Outpatient Medications on File Prior to Visit   Medication Sig Dispense Refill    levothyroxine (SYNTHROID, LEVOTHROID) 50 MCG tablet Take 1 tablet by mouth Every Morning. 90 tablet 1    sertraline (Zoloft) 50 MG tablet Take 1 tablet by mouth Daily. 90 tablet 0     No current facility-administered medications on file prior to visit.            /62   Pulse 101   Temp 99.9 °F (37.7 °C) (Oral)   Ht 157.5 cm (62.01\")   Wt 94.6 kg (208 lb 9.6 oz)   SpO2 95% Comment: room air  BMI 38.14 kg/m²          Objective     Physical Exam  Vitals and nursing note reviewed. Chaperone present: Patient declined chaperone.   Constitutional:       General: She is not in acute distress.     Appearance: Normal appearance.   HENT:      Head: Normocephalic and atraumatic.      Right Ear: Tympanic membrane, ear canal and external ear normal.      Left Ear: " Tympanic membrane, ear canal and external ear normal.      Nose: No congestion or rhinorrhea.      Mouth/Throat:      Mouth: Mucous membranes are moist.      Pharynx: Oropharynx is clear. No posterior oropharyngeal erythema.   Eyes:      Extraocular Movements: Extraocular movements intact.      Conjunctiva/sclera: Conjunctivae normal.      Pupils: Pupils are equal, round, and reactive to light.   Cardiovascular:      Rate and Rhythm: Normal rate and regular rhythm.      Heart sounds: Normal heart sounds.   Pulmonary:      Effort: Pulmonary effort is normal. No respiratory distress.      Breath sounds: Normal breath sounds. No wheezing.   Chest:   Breasts:     Left: Swelling, skin change (Erythema as shown on) and tenderness present.       Musculoskeletal:      Cervical back: Normal range of motion. No rigidity.   Lymphadenopathy:      Upper Body:      Left upper body: Axillary adenopathy present.   Skin:     General: Skin is warm and dry.   Neurological:      Mental Status: She is alert and oriented to person, place, and time.   Psychiatric:         Mood and Affect: Mood normal.         Behavior: Behavior normal.                       Lab Results (last 24 hours)       Procedure Component Value Units Date/Time    POCT SARS-CoV-2 Antigen RADHA + Flu [[id number]] Collected: 04/30/24 0950    Specimen: Swab Updated: 04/30/24 0952     SARS Antigen Not Detected     Influenza A Antigen RADHA Not Detected     Influenza B Antigen RADHA Not Detected     Internal Control Passed     Lot Number [id number]     Expiration Date 9/20/24                          Assessment & Plan  Mastitis, left, acute  Will treat with Keflex as below.  Also encouraged cool compresses, Tylenol and ibuprofen to help with pain.  She should not express milk as she has stopped breast-feeding.  Follow-up if symptoms do not improve in 24 to 48 hours  Body aches      Orders Placed This Encounter   Procedures    POCT SARS-CoV-2 Antigen RADHA + Flu     New Medications  Ordered This Visit   Medications    cephalexin (KEFLEX) 500 MG capsule     Sig: Take 1 capsule by mouth 4 (Four) Times a Day for 7 days.     Dispense:  28 capsule     Refill:  0                FOR FULL DISCHARGE INSTRUCTIONS/COMMENTS/HANDOUTS please see the   AVS

## 2024-05-16 ENCOUNTER — OFFICE VISIT (OUTPATIENT)
Dept: BEHAVIORAL HEALTH | Facility: CLINIC | Age: 29
End: 2024-05-16
Payer: COMMERCIAL

## 2024-05-16 VITALS
OXYGEN SATURATION: 98 % | HEART RATE: 82 BPM | DIASTOLIC BLOOD PRESSURE: 82 MMHG | WEIGHT: 206.8 LBS | BODY MASS INDEX: 38.05 KG/M2 | HEIGHT: 62 IN | SYSTOLIC BLOOD PRESSURE: 110 MMHG

## 2024-05-16 DIAGNOSIS — F33.1 MAJOR DEPRESSIVE DISORDER, RECURRENT EPISODE, MODERATE: Primary | ICD-10-CM

## 2024-05-16 DIAGNOSIS — F41.1 GENERALIZED ANXIETY DISORDER: ICD-10-CM

## 2024-05-16 DIAGNOSIS — Z79.899 MEDICATION MANAGEMENT: ICD-10-CM

## 2024-05-16 DIAGNOSIS — F42.4 COMPULSIVE SKIN PICKING: ICD-10-CM

## 2024-05-16 NOTE — PATIENT INSTRUCTIONS
"1. Should you want to get in touch with your provider, SCHUYLER Terry, please contact MY Medical Assistant, Shaina, directly at [phone].  Recommend saving Shaina's direct number in phone as this is the PREFERRED & EASIEST way to get in contact with your provider.  Please leave a voice mail if you do not get an answer and she will return your call within 24 hrs. You will NOT be able to contact provider on [place], as Behavioral Health Providers are restricted. YOU MUST CALL [phone]  If you need to speak with the on call provider after hours or on weekends, please Contact the [facility] ([phone]) and staff will be able to page the provider on call directly.     2.  In the event you need to cancel an appointment, please notify the office at least 24 hrs prior:   Contact **Shaina Medical Assistant at [facility] directly at [phone] or the [facility] ([phone])     3. MEDICATION REFILLS:  PLEASE CALL THE PHARMACY TO REQUEST ALL MEDICATION REFILLS or via [facility] TO ENSURE YOU ARE RECEIVING YOUR MEDICATIONS IN A TIMELY MANNER. The pharmacy or [facility] will send this request ELECTRONICALLY to the ordering provider.   IF YOU USE AN AUTOMATED SERVICE AT THE PHARMACY FOR REFILLS AND ARE TOLD THERE ARE \"NO REFILLS REMAINING\"   PLEASE CALL THE PHARMACY & SPEAK TO A LIVE PERSON TO VERIFY IT IS THE MOST UP TO DATE PRESCRIPTION ON FILE.    All new prescriptions will have a different number, therefore, if you were given refills for a medication today or at last visit it will not have the same number as the previous prescription.     4.  In the event you have personal crisis, contact the following crisis numbers: Suicide Prevention Hotline 1-901.355.1612 or *988, [facility] Helpline 9-204-857-LWQA; [facility] Emergency Room [phone]; text HELLO to 289166; or 911.  If you feel like harming yourself or others, call 911 right away.  You can call the 983 Suicide and Crisis " "Lifeline at  988   to speak with a counselor at the [facility], or you can connect with one using their online chat  .    5.  Never stop an antidepressant medicine without first talking to a healthcare provider. Suddenly stopping this type of medication can cause withdrawal symptoms.    6.  Counseling and talk therapy  Counseling or therapy teaches you new coping skills and more adaptive ways of thinking about problems. These tools can help you make positive changes. The benefits of counseling often last long after treatment sessions have stopped.    7.   We would appreciate your feedback, please scan the QRS code on the back of your appointment card (or see below) and complete a brief survey.  [place] location is still not available, so please click \"[place]\" location.  Thank you      SPECIFIC RECOMMENDATIONS:     1.      Medications discussed at this encounter:     Stop Zoloft                     -  Start Trintellix 10 mg by mouth daily with food.  Onset of action is usually in 2 weeks. GI symptoms can last up to 14 days, may take at night if nausea persists.      2.      Psychotherapy recommendations: n/a     3.     Return to clinic: 6 weeks Monday 6/24/24 at 8 am     Please arrive at least 15 minutes before your scheduled appointment time to complete check in process.      IF you are scheduled for a [facility] VIDEO visit, YOU MUST COMPLETE THE \"E-CHECK IN\" PROCESS PRIOR TO BEGINNING THE VISIT, YOU WILL NO LONGER RECEIVE A PHONE CALL PRIOR TO ALL VIDEO VISITS; You may still complete the E-Check in for in office visits prior to appointment, you will receive multiple text/email reminders which will direct you further if needed.           "

## 2024-05-16 NOTE — PROGRESS NOTES
"Subjective   Rolanda Neumann is a 28 y.o. female who presents today for follow up    Referring Provider:  No referring provider defined for this encounter.    Chief Complaint:  anxiety, depression, skin picking, medication management    Answers submitted by the patient for this visit:  Primary Reason for Visit (Submitted on 5/10/2024)  What is the primary reason for your visit?: Depression  Depression (Submitted on 5/10/2024)  Chief Complaint: Depression  Visit: follow-up  Frequency: constantly  Severity: moderate  excessive worry: No  insomnia: No  irritability: Yes  malaise/fatigue: Yes  obsessions: Yes  hypersomnia: Yes  difficulty controlling mood: Yes  Medication compliance: %  Side effects: fatigue  Aggravated by: family issues, medication, social activities, work stress      History of Present Illness:   5/16/24: Patient presents today in office reports not feeling much different than last time.  Patient brought in some notes today to discuss, as patient had kept track of mood- would feel irritable if not taking Zoloft, no interest in talking with co-workers, puts Ricky down feeling physically and mentally exhausted-watches him play, desires to interact though feels tired, overwhelmed. Exhausted with little interest, hard time, short tempered with mom and ; \"picking holes in my scalp and tearing my fingers up\" while driving will pick until bleeding, which is new behavior in the past month, picks at skin, places on back pimples, toenails, excessively pick.  Friends have randomly asked her what is wrong, and boss had asked her friend if she was okay based on demeanor. Though patient was not aware.  This friend is 40 yrs old, known each other for over 10 yrs, confides in her, and have discussed medications, and 2 months ago started seeing a provider and started Trintellix with improvement in anxiety and depression.   Patient and spouse will be moving out of mothers home the end of the month, as " "they will be closing on a house, \"my mom and I are best friends.\"  Mom will come to home and care for son while patient sleeps as she works 3rd shift.  Patient does have some anxiety about move.  Feels guilty disengaging with son, though doesn't want to feel that way, and watch his life flash before her eyes. Desires to have energy and available emotionally and physically for son.   Denies difficulty bonding with baby, withdrawing from family & friends, feeling confused or lost, having obsessive thoughts or negative intrusive thoughts about baby, making attempts to harm herself or baby, difficulty caring for baby.      Date of Delivery: 23  Delivery type:   Feeding: bottle feeding (stopped breast feeding at 6 mo. & 1 week of baby birth-4/10/24 had tried to stop prior, .  Baby health problems or special needs: baby contracted COVID 2024, had high fever, heart rate but better now; otherwise denied  baby has good appetite, active, trying to walk  Relationship problems with spouse: denies  Current support system includes: spouse, mother,friend.    Current stressors: moving, weddings this summer of friends  Sleep: no problems  Current birth control method: none  History of post partum depression:first child only,     24:  Patient presents today in office, at last visit patient was advised to seek therapy and list of local counselors were given and patient was to continue dose of Zoloft 50 mg which was started early 2024.  Patient reports had missed a few days due to not picking up refill, and became extremely irritable and angry, which subsided after a few days of restarting.  Patient reports upon request of Zoloft last week, recalls the pharmacy at the clinic system was down and were not able to bill insurance, and significant other had to talk to pharmacy due to irritability and feeling poorly.     Patient continues to breast feed 5 month old son, denies negative intrusive thoughts of harm to " "baby or self, and if patient feels she can't calm baby down will have his father hold him to help.  Patient is bonding with baby well.     Patient has not reached out to therapy thus far, though plans to do so eventually.   Patient has lost 4 lbs since last appointment and reports intermittent fasting.  Patient had spoke with spouse about Wellbutrin, and seen pictures and videos of patient when she was taking, as she was happier.  And patient recalls patient was active, exercising, going out more, took care of skin, teeth, which has been minimal.  Patient has started to walk more, prepregnancy weight was 134 lbs and now 207, and is trying to lose weight, and feels the weight increase negatively impacts mood.  Reports past use of Wellbutrin and Phentermine and didn't experience side effects. Patient did express concern of serotonin syndrome.  Patient also tried Topamax at the weight loss clinic which patient did not tolerate, had difficulty with word finding, depth perception difficulties.    Initially prescribed Wellbutrin for weight loss and spouse seems to think it made a difference in mood.   Patient wishes to continue breast feeding as long as she can, though patient feels baby is using as more comfort, and doesn't feel he is getting adequate nutrients.  Baby is taking in rice cereal, jar baby food, and nurses at night, morning around 5 am upon patient return home from work, and before naps 2-3 times per day which varies, and is producing adequate milk supply.  \"He seems hungrier than usual, he is 19 lbs. I have no definite plan to stop or keep going, it depends on how it goes.\"    1/15/24:  INITIAL EVAL  Answers submitted by the patient for this visit:  Other (Submitted on 1/14/2024)  Please describe your symptoms.: Constant worry. Starting multiple tasks and then just leaving them. feeling like i have to do things but getting overwhelmed by them and then just avoiding them (distracted?). Nervous? " "Overstimulated, troubke/avoiding socializing. I get irritable when theres too much going on. frustration in certain situations that make me just shut down. Etc.  Have you had these symptoms before?: Yes  How long have you been having these symptoms?: Greater than 2 weeks  Please list any medications you are currently taking for this condition.: Zoloft  Please describe any probable cause for these symptoms. : Mental/behavioral  Primary Reason for Visit (Submitted on 2024)  What is the primary reason for your visit?: Other    Provider introduced self, as well as credentials, and informed of provider role which will primarily include medication management of mental health conditions. Explained the difference between provider role vs. therapy/counseling services which include CBT (talk therapy) and do not include management of medications which are typically 45 minutes to 1 hr in length, however, if patient was in agreement to start therapy this provider can provide a contact list and/or refer. Patient verbalized understanding and agreed to proceed.    Patient presents today in office with a history of anxiety, depression for which treatment began in .  Patient is currently prescribed Zoloft 50 mg (23), which have provided minimal effectiveness.  Patient has a PMHX of IBS.  Status post  23 at 40 weeks, 8 lb 6 ounces baby boy, \"Ricky\", , patient is breastfeeding and formula feeding.  Patient plans to breast feed at least 6 months to 1 yr.     Patient goal of treatment \"I am unsure\"    Patient reports concerns of taking an antidepressant, and wanted to see a provieder in person vs internet as patient had seen a provider via [facility] in , and was not pleased.  Patient has suspicions of own based on family history or diagnosis.   Patient has not had Zoloft for the last few days due to not picking up refill, though has been taking since prescribed.      Patient reports planned pregnancy, " "denies feeling depressed throughout the pregnancy, though has some sad days, though primarily happy.  Patient admits to worrying about SIDS, sad and scared to leave baby at night, as patient works night shift.  Has been told having post partum depression, which patient disagrees. Denies thoughts of harm to baby, if feeling burnt out will go to mothers house and take a nap.  Patient does have help and is not afraid to voice need for help when needed.  Spouse works day shift.  Patient feels feelings are valid normal feelings.  Patient has been bonding with baby.     Patient admits to feeling overwhelmed, irritability.  This morning  had to run errands and patient mother is watching baby.  \"I get snippy.\" If baby is crying a lot and unable to console him, as he did have colic early on which resolved.  Patient reports baby likes to be held, and gets overwhelmed with the constant \"touching.\"  Baby will be transitioning from bassinet to crib which will be in patient and spouses room.  When patient is home baby is nursing, will get breast milk or formula in bottles sometimes. \"I so much do not like to be touched, especially the chest area, I am used to it now.\" Which is not new. Loves holding baby, touch from baby.   Sometimes with spouse \"I am not a very, I don't enjoy sexual activity very much, I don't like being touched very much.\"     All throughout school patient had difficulty concentrating, took twice as long to get things done.  2 yr college program took 6 yrs to complete, patient was only able to complete 1-2 classes per semester.  Admits to easily distracted.  \"Friends call me airhead, your a goof ball, marko, made me feel not intelligent.\"   Mom and  say she is like her sister, and should see someone, which patient has been delaying, and now with having a kid, wants to be the best version of herself fo rher child. To understand self better.     ADHD:   Elementary school:   Grades: average grades; " "first 2 yrs of highschool struggled, last 2 yrs \"buckled down\" and college pre-requisites did okay, had to withdrawal from math due to poor grades (college algebra) and core classes-advertising/graphic design and made straight A's, took it very slow, able to apply self, encouragement from professors made her strive to do better.  Special classes or failures: never good at math, science was okay  Got in trouble:recalls \"chewing sticky tack, stupid stuff\" more of a hemanth boy, had male friends, \"I was rough and rowdy\" recalls being talked to about behavior should be more feminine.   Referral for ADHD testing:no  Fhx: sister (18 months younger) diagnosed in elementary school, also with Asperger's  Presently:  Problems with attention to detail: Sometimes fixes spelling errors if noticed,  Problems with sustained attention: Sometimes can be in longer conversations, which she prefers over small talk  Problems listening when spoken to directly: Yes (mind seems elsewhere, even in the absences of any obvious distraction) \"I have like an internal and external dialogue, I pay attention to everything around me, what I am having for lunch later, what I have to do later.\"   Failure to finish tasks: Yes-laundry puts off for days, and randomly will accomplish tasks; starts art or design project, or cleaning a room, for example getting everything out of closet, and loses interest, thinks will get to tomorrow and no longer has drive to complete, and items will sit for long duration and not put back. Will buy a bunch of stuff to make cookies, and then feels overwhelmed and doesn't bake the cookies.   Avoids/Dislikes/Reluctant to engage in tasks that require sustained mental effort: yes and no has been at [employer] for 5 yrs, and wont allow self to step away to do what she went to school for, due to fear of starting over, benefits, and current job is fairly easy. If had an assignment in school would put off, though if passionate about the " "assignment could work on for several hours at a time.   Easily distracted: Yes (unrelated thoughts, external factors)   Forgetting things: I feel my forgetfulness is used as a convenience, I avoid calls, and tell people I will call them back, but I don't and I don't feel bad about it.\"  Losing things: No, \"I am pretty good about not losing things.\"  Hard to organize: Yes- difficulty managing sequential tasks, keeping materials and belongings in order, messy, disorganized work, procrastinates; if in organizational mood will go back to fix things, has totes.   Talks a lot and cutting people off:Yes and sometimes, then has days and times of not wanting to talk, and gets irritated when others are trying to talk    Drifts off during conversations:Yes  Difficulty with Reading: No\"I like to read\" has a hard time finding the time, used to be able to finish a book in one day  Xiii. Difficulty watching TV/Movies: No, pays attention the first time watches, keeps tv running for background noise;     Depression: Patient complains of depression. She complains of anhedonia, depressed mood, difficulty concentrating, fatigue, feelings of worthlessness/guilt, and insomnia     Anxiety: The patient endorses significant symptoms of anxiety including:  worrisome,anxiousness, being easily fatigued, difficulty concentrating or mind going blank, irritability, and sleep disturbance which have caused impairment in important areas of daily functioning.     The following portions of the patient's history were reviewed and updated as appropriate: allergies, current medications, past family history, past medical history, past social history, past surgical history and problem list.       PHQ-9 Depression Screening  PHQ-9 Total Score: 155/16/2024 15     Little interest or pleasure in doing things? 1-->several days   Feeling down, depressed, or hopeless? 2-->more than half the days   Trouble falling or staying asleep, or sleeping too much? 2-->more " than half the days   Feeling tired or having little energy? 3-->nearly every day   Poor appetite or overeating? 3-->nearly every day   Feeling bad about yourself - or that you are a failure or have let yourself or your family down? 2-->more than half the days   Trouble concentrating on things, such as reading the newspaper or watching television? 2-->more than half the days   Moving or speaking so slowly that other people could have noticed? Or the opposite - being so fidgety or restless that you have been moving around a lot more than usual? 0-->not at all   Thoughts that you would be better off dead, or of hurting yourself in some way? 0-->not at all   PHQ-9 Total Score 15     SARAH-7  Feeling nervous, anxious or on edge: Several days  Not being able to stop or control worrying: Not at all  Worrying too much about different things: Several days  Trouble Relaxing: Several days  Being so restless that it is hard to sit still: Several days  Feeling afraid as if something awful might happen: Not at all  Becoming easily annoyed or irritable: More than half the days  SARAH 7 Total Score: 6  If you checked any problems, how difficult have these problems made it for you to do your work, take care of things at home, or get along with other people: Not difficult at all5/16/2024 6    Past Surgical History:  Past Surgical History:   Procedure Laterality Date    COLONOSCOPY      normal -2015    WISDOM TOOTH EXTRACTION         Problem List:  Patient Active Problem List   Diagnosis    Obesity (BMI 35.0-39.9 without comorbidity)    Anxiety    Postpartum depression       Allergy:   No Known Allergies     Discontinued Medications:  Medications Discontinued During This Encounter   Medication Reason    sertraline (Zoloft) 50 MG tablet Not Efficacious       Current Medications:   Current Outpatient Medications   Medication Sig Dispense Refill    levothyroxine (SYNTHROID, LEVOTHROID) 50 MCG tablet Take 1 tablet by mouth Every Morning. 90  tablet 1    Vortioxetine HBr (Trintellix) 10 MG tablet tablet Take 1 tablet by mouth with food every night at bedtime.  Indications: Major Depressive Disorder 30 tablet 1     No current facility-administered medications for this visit.       Past Medical History:  Past Medical History:   Diagnosis Date    Anxiety     Depression     IBS (irritable bowel syndrome)     Kidney stone        Past Psychiatric History:  Began Treatment:   Diagnoses:Depression and Anxiety  Psychiatrist: [facility] provider   Therapist:Denies  Admission History:Denies  Medication Trials: Prozac and Wellbutrin combination--2023 due to pregnancy Took Wellbutrin several years ago for IBS; initially given Wellbutrin for weight loss effective for mood and weight loss.Zoloft-ineffective, increased appetite     Self Harm: Denies  Suicide Attempts:Denies   Psychosis, Anxiety, Depression: Denies    Substance Abuse History:   Types:Denies all, including illicit  Withdrawal Symptoms:Denies  Longest Period Sober:Not Applicable   AA: Not applicable   Legal: n/a    Social History: As of 1/15/24  Martial Status:  Employed:Yes and 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   History: Denies  Access to Guns:  Yes, locked    Social History     Socioeconomic History    Marital status:     Number of children: 1    Highest education level: Associate degree: academic program   Tobacco Use    Smoking status: Former     Current packs/day: 0.00     Average packs/day: 0.3 packs/day for 1 year (0.3 ttl pk-yrs)     Types: Cigarettes     Start date: 2011     Quit date: 2012     Years since quittin.3     Passive exposure: Past    Smokeless tobacco: Never   Vaping Use    Vaping status: Every Day    Start date: 2021    Substances: Nicotine, Flavoring     Devices: Disposable, Pre-filled or refillable cartridge, Pre-filled pod   Substance and Sexual Activity    Alcohol use: Not Currently     Alcohol/week: 2.0 standard drinks of alcohol     Types: 2 Glasses of wine per week    Drug use: Not Currently     Types: Marijuana     Comment: in high school 2012 tried once-didn't like it    Sexual activity: Not Currently     Partners: Male     Birth control/protection: None       Family History:   Suicide Attempts: Denies  Suicide Completions:Denies      Family History   Problem Relation Age of Onset    Diabetes Mother     Alcohol abuse Father     Diabetes Father     Depression Sister     Anxiety disorder Sister     ADD / ADHD Sister     Other Sister         ADHD    Thyroid disease Maternal Aunt     Thyroid disease Maternal Aunt     Thyroid disease Maternal Aunt     Cancer Maternal Grandmother         unsure of type    Cancer Paternal Grandfather         prostate and then another type    Cancer Paternal Grandmother         lung on 2 occasions       Developmental History:   Born: [place]  Siblings:1 sister  Childhood: Denies Abuse  High School:Completed  College: Associates in [employer] and [employer] certificate    Mental Status Exam:   Hygiene:   good  Cooperation:  Cooperative  Eye Contact:  Good  Psychomotor Behavior:  Appropriate  Affect:  Appropriate  Mood: sad, depressed, and anxious   Speech:  Normal  Thought Process:  Goal directed  Thought Content:  Mood congruent  Suicidal:  None  Homicidal:  None  Hallucinations:  None  Delusion:  None  Memory:  Intact  Orientation:  Grossly intact  Reliability:  good  Insight:  Good  Judgement:  Good  Impulse Control:  Good  Physical/Medical Issues:  No      Review of Systems:  Review of Systems   Constitutional:  Positive for fatigue.   Respiratory:  Negative for cough and shortness of breath.    Cardiovascular:  Negative for chest pain and palpitations.   Neurological:  Negative for dizziness and seizures.  "  Psychiatric/Behavioral:  Positive for agitation. Negative for confusion, decreased concentration, hallucinations, sleep disturbance and suicidal ideas. The patient is nervous/anxious. The patient is not hyperactive.          Physical Exam:  Physical Exam  Psychiatric:         Attention and Perception: Attention and perception normal.         Mood and Affect: Mood is anxious and depressed. Affect is flat.         Speech: Speech normal.         Behavior: Behavior normal. Behavior is cooperative.         Thought Content: Thought content normal. Thought content does not include suicidal ideation. Thought content does not include suicidal plan.         Cognition and Memory: Cognition and memory normal.         Judgment: Judgment normal.         Vital Signs:   /82   Pulse 82   Ht 157.5 cm (62.01\")   Wt 93.8 kg (206 lb 12.8 oz)   SpO2 98%   BMI 37.81 kg/m²      Office notes from care team reviewed:  Date of Service: 4/30/24 OV with DAVID Stark with [facility]   Date of Service: 3/12/24 OV with SCHUYLER Dickinson with [facility]       Lab Results: Reviewed  Office Visit on 04/30/2024   Component Date Value Ref Range Status    SARS Antigen 04/30/2024 Not Detected  Not Detected, Presumptive Negative Final    Influenza A Antigen RADHA 04/30/2024 Not Detected  Not Detected Final    Influenza B Antigen RADHA 04/30/2024 Not Detected  Not Detected Final    Internal Control 04/30/2024 Passed  Passed Final    Lot Number 04/30/2024 [id number]   Final    Expiration Date 04/30/2024 9/20/24   Final   Lab on 04/03/2024   Component Date Value Ref Range Status    TSH 04/03/2024 1.180  0.270 - 4.200 uIU/mL Final    Thyroid Peroxidase Antibody 04/03/2024 19  0 - 34 IU/mL Final    Thyroglobulin Ab 04/03/2024 2.9 (H)  0.0 - 0.9 IU/mL Final    Thyroglobulin Antibody measured by [facility]  It should be noted that the presence of thyroglobulin antibodies  may not be pathogenic nor diagnostic, especially at very low  levels. The " assay  has found that four percent of  individuals without evidence of thyroid disease or autoimmunity  will have positive TgAb levels up to 4 IU/mL.   Lab on 02/26/2024   Component Date Value Ref Range Status    Hemoglobin A1C 02/26/2024 5.60  4.80 - 5.60 % Final    T3, Free 02/26/2024 3.31  2.00 - 4.40 pg/mL Final    Free T4 02/26/2024 0.98  0.93 - 1.70 ng/dL Final    Glucose 02/26/2024 101 (H)  65 - 99 mg/dL Final    BUN 02/26/2024 16  6 - 20 mg/dL Final    Creatinine 02/26/2024 0.76  0.57 - 1.00 mg/dL Final    Sodium 02/26/2024 141  136 - 145 mmol/L Final    Potassium 02/26/2024 4.2  3.5 - 5.2 mmol/L Final    Chloride 02/26/2024 101  98 - 107 mmol/L Final    CO2 02/26/2024 26.3  22.0 - 29.0 mmol/L Final    Calcium 02/26/2024 9.7  8.6 - 10.5 mg/dL Final    Total Protein 02/26/2024 7.5  6.0 - 8.5 g/dL Final    Albumin 02/26/2024 4.3  3.5 - 5.2 g/dL Final    ALT (SGPT) 02/26/2024 27  1 - 33 U/L Final    AST (SGOT) 02/26/2024 18  1 - 32 U/L Final    Alkaline Phosphatase 02/26/2024 91  39 - 117 U/L Final    Total Bilirubin 02/26/2024 0.3  0.0 - 1.2 mg/dL Final    Globulin 02/26/2024 3.2  gm/dL Final    A/G Ratio 02/26/2024 1.3  g/dL Final    BUN/Creatinine Ratio 02/26/2024 21.1  7.0 - 25.0 Final    Anion Gap 02/26/2024 13.7  5.0 - 15.0 mmol/L Final    eGFR 02/26/2024 108.3  >60.0 mL/min/1.73 Final    Total Cholesterol 02/26/2024 239 (H)  0 - 200 mg/dL Final    Triglycerides 02/26/2024 172 (H)  0 - 150 mg/dL Final    HDL Cholesterol 02/26/2024 43  40 - 60 mg/dL Final    LDL Cholesterol  02/26/2024 164 (H)  0 - 100 mg/dL Final    VLDL Cholesterol 02/26/2024 32  5 - 40 mg/dL Final    LDL/HDL Ratio 02/26/2024 3.76   Final    Thyroid Peroxidase Antibody 02/26/2024 18  0 - 34 IU/mL Final    Thyroglobulin Ab 02/26/2024 1.6 (H)  0.0 - 0.9 IU/mL Final    Thyroglobulin Antibody measured by [facility]  It should be noted that the presence of thyroglobulin antibodies  may not be pathogenic nor  diagnostic, especially at very low  levels. The assay  has found that four percent of  individuals without evidence of thyroid disease or autoimmunity  will have positive TgAb levels up to 4 IU/mL.    TSH 02/26/2024 0.548  0.270 - 4.200 uIU/mL Final    WBC 02/26/2024 6.55  3.40 - 10.80 10*3/mm3 Final    RBC 02/26/2024 5.19  3.77 - 5.28 10*6/mm3 Final    Hemoglobin 02/26/2024 13.7  12.0 - 15.9 g/dL Final    Hematocrit 02/26/2024 41.8  34.0 - 46.6 % Final    MCV 02/26/2024 80.5  79.0 - 97.0 fL Final    MCH 02/26/2024 26.4 (L)  26.6 - 33.0 pg Final    MCHC 02/26/2024 32.8  31.5 - 35.7 g/dL Final    RDW 02/26/2024 14.3  12.3 - 15.4 % Final    RDW-SD 02/26/2024 41.8  37.0 - 54.0 fl Final    MPV 02/26/2024 9.0  6.0 - 12.0 fL Final    Platelets 02/26/2024 257  140 - 450 10*3/mm3 Final    Neutrophil % 02/26/2024 52.9  42.7 - 76.0 % Final    Lymphocyte % 02/26/2024 38.3  19.6 - 45.3 % Final    Monocyte % 02/26/2024 6.7  5.0 - 12.0 % Final    Eosinophil % 02/26/2024 1.4  0.3 - 6.2 % Final    Basophil % 02/26/2024 0.5  0.0 - 1.5 % Final    Immature Grans % 02/26/2024 0.2  0.0 - 0.5 % Final    Neutrophils, Absolute 02/26/2024 3.47  1.70 - 7.00 10*3/mm3 Final    Lymphocytes, Absolute 02/26/2024 2.51  0.70 - 3.10 10*3/mm3 Final    Monocytes, Absolute 02/26/2024 0.44  0.10 - 0.90 10*3/mm3 Final    Eosinophils, Absolute 02/26/2024 0.09  0.00 - 0.40 10*3/mm3 Final    Basophils, Absolute 02/26/2024 0.03  0.00 - 0.20 10*3/mm3 Final    Immature Grans, Absolute 02/26/2024 0.01  0.00 - 0.05 10*3/mm3 Final       EKG Results:  No orders to display       Imaging Results:  No Images in the past 120 days found..      Assessment & Plan   Diagnoses and all orders for this visit:    1. Major depressive disorder, recurrent episode, moderate (Primary)  -     Vortioxetine HBr (Trintellix) 10 MG tablet tablet; Take 1 tablet by mouth with food every night at bedtime.  Indications: Major Depressive Disorder  Dispense: 30 tablet; Refill:  1    2. Generalized anxiety disorder    3. Compulsive skin picking    4. Medication management            Visit Diagnoses:    ICD-10-CM ICD-9-CM   1. Major depressive disorder, recurrent episode, moderate  F33.1 296.32   2. Generalized anxiety disorder  F41.1 300.02   3. Compulsive skin picking  F42.4 312.39   4. Medication management  Z79.899 V58.69           PLAN:  Safety: No acute safety concerns  Therapy:  prefers in person, though does not wish to drive out of [place]  has not reached out at time of visit. Due to busy schedule.   Risk Assessment: Risk of self-harm acutely is moderate. Risk factors include anxiety disorder and mood disorder, access to guns/weapons.  Protective factors include no family history, no present SI, no history of suicide attempts or self-harm in the past, minimal AODA, healthcare seeking, future orientation, willingness to engage in care.  Risk of self-harm chronically is also moderate, but could be further elevated in the event of treatment noncompliance and/or AODA.  Meds:  Stop Zoloft 50 mg due to ineffectiveness, increased appetite.    Start Trintellix 10 mg by mouth daily with food to target anxiety and depression.  Risks, benefits, alternatives discussed with patient including nausea, vomiting, constipation, sexual dysfunction, increased risk of bleeding especially when combined with anticoagulants (NSAIDS, blood thinners), when combined with triptans could theoretically cause weakness, hyperreflexia, and incoordination, caution with history of seizures.  Onset of action is usually in 2 weeks. GI symptoms can last up to 14 days, may take at night if nausea persists.  After discussion of these risks and benefits, the patient voiced understanding and agreed to proceed. Included past tried medications in order.      5.   Labs: n/a  6.   Will update Dr. Olivera of patient plan of care due to post partum.     Symptoms of anxiety, depression are under fair control with current  medication regimen.  Suspect new onset of skin picking is related to underlying anxiety and depression, which is not controlled.  The plan was discussed with the patient. The patient was given time to ask questions and these questions were answered. At the conclusion of their visit they had no additional questions or concerns and all questions were answered to their satisfaction.   Patient was given instructions and counseling regarding condition and for health maintenance advice. Please see specific information pulled into the AVS if appropriate.    Patient to contact provider if symptoms worsen or fail to improve.        2/29/24:   -Therapy: prefers in person, though does not wish to drive out of [place] has not reached out at time of visit.   -Continue Zoloft 50 mg by mouth daily to target depression, anxiety. Patient informed Zoloft is safe in breastfeeding as it is present in the breastmilk at a concentration far less than 10% (the threshold). NR needed  -Will update Dr. Olivera of patient plan of care due to post partum.   -Patient instructed to request refills when appropriate, when down to 5-7 days remaining via  pharmacy or via MyChart.       Symptoms of anxiety, depression are under good control with current medication regimen.  Discussed option to add Wellbutrin when no longer breast feeding.   Patient was given instructions and counseling regarding condition and for health maintenance advice. Please see specific information pulled into the AVS if appropriate.    Patient to contact provider if symptoms worsen or fail to improve.        1/15/24:   -Safety: No acute safety concerns  -Therapy: prefers in person, though does not wish to drive out of [place]   Patient educated and encouraged to start therapy to develop new coping skills and more adaptive ways of thinking about problems. These tools can help make positive changes. The benefits of counseling often last long after treatment sessions have  stopped.  Advised patient to contact insurance company to determine available therapist in area and/or check [facility] and filter results based on needs.  List of local counselors also given to patient and instructed to contact provider of choice to schedule an appointment.  And to contact provider if a referral order is needed.      Continue Zoloft 50 mg by mouth daily to target depression, anxiety.  Discussed all risks, benefits, alternatives, and side effects of Zoloft (Sertraline) including but not limited to GI upset, sexual dysfunction, bleeding risk, theoretical decrease of seizure threshold predisposing the patient to a slightly higher seizure risk, insomnia, sedation, dizziness, fatigue, drowsiness, activation of orestes or hypomania, increased fragility fracture risk, hyponatremia, ocular effects, withdrawal syndrome following abrupt discontinuation, serotonin syndrome, and increased suicidality in patients 24 years and younger.  Patient educated on the need to practice safe sex while taking this medication. Discussed the need for patient to immediately call the office for any new or worsening symptoms, such as worsening depression; feeling nervous or restless; suicidal thoughts or actions; or other changes in mood or behavior, and all other concerns. Patient  educated on medication compliance and the risks of suddenly stopping this medication or missing doses. Patient verbalized understanding and is agreeable to taking Sertraline. Addressed all questions and concerns. Informed patient Antidepressants can take 4-6 weeks to start working and up to 2-3 months for the full benefits, usually people start feeling better in 2 weeks.  Patient informed Zoloft is safe in breastfeeding as it is present in the breastmilk at a concentration far less than 10% (the threshold).   Patient started Zoloft approximately 1 month ago, patient to  dose today as patient has not had the last 2 days.     Patient  informed that going forward refills of current psychotropic medications previously prescribed by PCP will now be managed by this provider, and to contact provider MA INITIALLY when down to 5-7 days remaining to ensure new refill(s) will have this provider name on prescription for future refills. Explained to patient if requested from current bottle, via mychart, or via pharmacy the request would be directed to ordering provider. And to prevent confusion, inaccurate dosing, inaccurate medication refills, the management of psychotropic and/or mental health medications should only be ordered by this mental health provider. Patient verbalized understanding and agreed to proceed.      -Advised patient to sign up for text alerts with current pharmacy, which will inform patient when a medication is ready, cost of medication, and when a refill is needed.  Patient reports currently enrolled.    -Patient informed if a medication is requested via telephone to office, MyChart, or with pharmacy directly, to check with their pharmacy (via phone, josue) or MyChart to check status of those requests before contacting the office.      Discussed and given patient the following education materials:  -Grounding Techniques, Cognitive distortions, 5 ways to defuse anxious thoughts, and What is Mindfulness with tips printout given to patient, provided by [employer] -How to Stop Over thinking Tips and coping strategies print out given to patient today from [facility].      -Will update Dr. Olivera of patient plan of care due to post partum.     -Patient did report drivers license still has maiden name, patient is  and updated significant other to spouse on face sheet.     -Patient presentation seems most consistent with anxiety, depression, and attention & concentration deficit.  Do not suspect post partum depression, as worries reported today are typical of a new mother and new baby at home.   Differential diagnosis include  but not limited to social anxiety, adjustment reaction, personality disorder, neurodevelopmental disorder (ADHD,ASD, Intellectual disorder). Which will require further work up at future visits.   Patient does not meet all criteria for a diagnosis of ADHD, suspect symptoms are more related to anxiety and depression, discussed how symptoms of anxiety, depression, and ADHD can overlap or mimic each other, amongst other mental health diagnoses.   Patient was given instructions and counseling regarding condition and for health maintenance advice. Please see specific information pulled into the AVS if appropriate.   Patient to contact provider if symptoms worsen or fail to improve.        Patient screened positive for depression based on a PHQ-9 score of 15 on 5/16/2024. Follow-up recommendations include: Suicide Risk Assessment performed and continue with current AD .       TREATMENT PLAN/GOALS: Continue supportive psychotherapy efforts and medications as indicated. Treatment and medication options discussed during today's visit. Patient acknowledged and verbally consented to continue with current treatment plan and was educated on the importance of compliance with treatment and follow-up appointments.    MEDICATION ISSUES:  ESAU reviewed as expected. MANUAL ESAU 2/29/24 AT 0642 #[id number].  Discussed medication options and treatment plan of prescribed medication as well as the risks, benefits, and side effects including potential falls, possible impaired driving and metabolic adversities among others. Patient is agreeable to call the office with any worsening of symptoms or onset of side effects. Patient is agreeable to call 911 or go to the nearest ER should he/she begin having SI/HI. No medication side effects or related complaints today.     MEDS ORDERED DURING VISIT:  New Medications Ordered This Visit   Medications    Vortioxetine HBr (Trintellix) 10 MG tablet tablet     Sig: Take 1 tablet by mouth with food  every night at bedtime.  Indications: Major Depressive Disorder     Dispense:  30 tablet     Refill:  1     Past trials of Zoloft, Prozac, Wellbutrin  plan to switch from Zoloft to Trintellix       Return in about 6 weeks (around 6/27/2024) for medication check.         I spent 41 minutes caring for Rolanda on this date of service. This time includes time spent by me in the following activities: preparing for the visit, reviewing tests, obtaining and/or reviewing a separately obtained history, performing a medically appropriate examination and/or evaluation, counseling and educating the patient/family/caregiver, ordering medications, tests, or procedures, referring and communicating with other health care professionals, documenting information in the medical record, care coordination, and scheduling .      This document has been electronically signed by SCHUYLER Terry  May 16, 2024 08:44 EDT           Part of this note may be an electronic transcription/translation of spoken language to printed text using the Dragon Dictation System.

## 2024-06-18 ENCOUNTER — TELEPHONE (OUTPATIENT)
Dept: FAMILY MEDICINE CLINIC | Age: 29
End: 2024-06-18
Payer: COMMERCIAL

## 2025-06-26 ENCOUNTER — TELEPHONE (OUTPATIENT)
Dept: FAMILY MEDICINE CLINIC | Age: 30
End: 2025-06-26
Payer: COMMERCIAL

## 2025-06-26 DIAGNOSIS — E06.3 AUTOIMMUNE THYROIDITIS: Primary | ICD-10-CM

## 2025-06-26 DIAGNOSIS — E78.2 MIXED HYPERLIPIDEMIA: ICD-10-CM

## 2025-07-16 ENCOUNTER — TELEPHONE (OUTPATIENT)
Dept: FAMILY MEDICINE CLINIC | Age: 30
End: 2025-07-16
Payer: COMMERCIAL

## 2025-07-21 ENCOUNTER — LAB (OUTPATIENT)
Dept: LAB | Facility: HOSPITAL | Age: 30
End: 2025-07-21
Payer: COMMERCIAL

## 2025-07-21 DIAGNOSIS — E78.2 MIXED HYPERLIPIDEMIA: ICD-10-CM

## 2025-07-21 DIAGNOSIS — E06.3 AUTOIMMUNE THYROIDITIS: ICD-10-CM

## 2025-07-21 LAB
ALBUMIN SERPL-MCNC: 4.3 G/DL (ref 3.5–5.2)
ALBUMIN/GLOB SERPL: 1.5 G/DL
ALP SERPL-CCNC: 60 U/L (ref 39–117)
ALT SERPL W P-5'-P-CCNC: 10 U/L (ref 1–33)
ANION GAP SERPL CALCULATED.3IONS-SCNC: 10.1 MMOL/L (ref 5–15)
AST SERPL-CCNC: 14 U/L (ref 1–32)
BASOPHILS # BLD AUTO: 0.02 10*3/MM3 (ref 0–0.2)
BASOPHILS NFR BLD AUTO: 0.3 % (ref 0–1.5)
BILIRUB SERPL-MCNC: 0.4 MG/DL (ref 0–1.2)
BUN SERPL-MCNC: 13 MG/DL (ref 6–20)
BUN/CREAT SERPL: 11.7 (ref 7–25)
CALCIUM SPEC-SCNC: 9.5 MG/DL (ref 8.6–10.5)
CHLORIDE SERPL-SCNC: 104 MMOL/L (ref 98–107)
CHOLEST SERPL-MCNC: 152 MG/DL (ref 0–200)
CO2 SERPL-SCNC: 24.9 MMOL/L (ref 22–29)
CREAT SERPL-MCNC: 1.11 MG/DL (ref 0.57–1)
DEPRECATED RDW RBC AUTO: 41.1 FL (ref 37–54)
EGFRCR SERPLBLD CKD-EPI 2021: 68.3 ML/MIN/1.73
EOSINOPHIL # BLD AUTO: 0.1 10*3/MM3 (ref 0–0.4)
EOSINOPHIL NFR BLD AUTO: 1.6 % (ref 0.3–6.2)
ERYTHROCYTE [DISTWIDTH] IN BLOOD BY AUTOMATED COUNT: 13.3 % (ref 12.3–15.4)
GLOBULIN UR ELPH-MCNC: 2.8 GM/DL
GLUCOSE SERPL-MCNC: 92 MG/DL (ref 65–99)
HCT VFR BLD AUTO: 40.1 % (ref 34–46.6)
HDLC SERPL-MCNC: 30 MG/DL (ref 40–60)
HGB BLD-MCNC: 13.6 G/DL (ref 12–15.9)
IMM GRANULOCYTES # BLD AUTO: 0 10*3/MM3 (ref 0–0.05)
IMM GRANULOCYTES NFR BLD AUTO: 0 % (ref 0–0.5)
LDLC SERPL CALC-MCNC: 107 MG/DL (ref 0–100)
LDLC/HDLC SERPL: 3.57 {RATIO}
LYMPHOCYTES # BLD AUTO: 2.37 10*3/MM3 (ref 0.7–3.1)
LYMPHOCYTES NFR BLD AUTO: 38.2 % (ref 19.6–45.3)
MCH RBC QN AUTO: 28.3 PG (ref 26.6–33)
MCHC RBC AUTO-ENTMCNC: 33.9 G/DL (ref 31.5–35.7)
MCV RBC AUTO: 83.5 FL (ref 79–97)
MONOCYTES # BLD AUTO: 0.41 10*3/MM3 (ref 0.1–0.9)
MONOCYTES NFR BLD AUTO: 6.6 % (ref 5–12)
NEUTROPHILS NFR BLD AUTO: 3.3 10*3/MM3 (ref 1.7–7)
NEUTROPHILS NFR BLD AUTO: 53.3 % (ref 42.7–76)
PLATELET # BLD AUTO: 245 10*3/MM3 (ref 140–450)
PMV BLD AUTO: 9.6 FL (ref 6–12)
POTASSIUM SERPL-SCNC: 3.7 MMOL/L (ref 3.5–5.2)
PROT SERPL-MCNC: 7.1 G/DL (ref 6–8.5)
RBC # BLD AUTO: 4.8 10*6/MM3 (ref 3.77–5.28)
SODIUM SERPL-SCNC: 139 MMOL/L (ref 136–145)
TRIGL SERPL-MCNC: 75 MG/DL (ref 0–150)
TSH SERPL DL<=0.05 MIU/L-ACNC: 1.27 UIU/ML (ref 0.27–4.2)
VLDLC SERPL-MCNC: 15 MG/DL (ref 5–40)
WBC NRBC COR # BLD AUTO: 6.2 10*3/MM3 (ref 3.4–10.8)

## 2025-07-21 PROCEDURE — 36415 COLL VENOUS BLD VENIPUNCTURE: CPT

## 2025-07-21 PROCEDURE — 80050 GENERAL HEALTH PANEL: CPT

## 2025-07-21 PROCEDURE — 80061 LIPID PANEL: CPT

## 2025-08-27 ENCOUNTER — TELEPHONE (OUTPATIENT)
Dept: FAMILY MEDICINE CLINIC | Age: 30
End: 2025-08-27

## 2025-08-27 PROBLEM — E66.9 OBESITY (BMI 35.0-39.9 WITHOUT COMORBIDITY): Status: RESOLVED | Noted: 2023-12-12 | Resolved: 2025-08-27

## 2025-08-27 PROBLEM — E66.3 OVERWEIGHT (BMI 25.0-29.9): Status: ACTIVE | Noted: 2025-08-27
